# Patient Record
Sex: FEMALE | Race: WHITE | NOT HISPANIC OR LATINO | Employment: UNEMPLOYED | URBAN - METROPOLITAN AREA
[De-identification: names, ages, dates, MRNs, and addresses within clinical notes are randomized per-mention and may not be internally consistent; named-entity substitution may affect disease eponyms.]

---

## 2017-01-31 ENCOUNTER — HISTORICAL (OUTPATIENT)
Dept: LAB | Facility: HOSPITAL | Age: 32
End: 2017-01-31

## 2017-03-09 ENCOUNTER — HISTORICAL (OUTPATIENT)
Dept: LAB | Facility: HOSPITAL | Age: 32
End: 2017-03-09

## 2017-03-23 ENCOUNTER — HOSPITAL ENCOUNTER (OUTPATIENT)
Dept: OBSTETRICS AND GYNECOLOGY | Facility: HOSPITAL | Age: 32
End: 2017-03-23
Attending: OBSTETRICS & GYNECOLOGY | Admitting: OBSTETRICS & GYNECOLOGY

## 2017-05-29 ENCOUNTER — HISTORICAL (OUTPATIENT)
Dept: LAB | Facility: HOSPITAL | Age: 32
End: 2017-05-29

## 2017-05-29 LAB — GLUCOSE 1H P 100 G GLC PO SERPL-MCNC: 152 MG/DL (ref 100–180)

## 2017-05-31 ENCOUNTER — HISTORICAL (OUTPATIENT)
Dept: LAB | Facility: HOSPITAL | Age: 32
End: 2017-05-31

## 2017-05-31 LAB
GLUCOSE 1H P 100 G GLC PO SERPL-MCNC: 179 MG/DL (ref 100–180)
GLUCOSE 2H P 100 G GLC PO SERPL-MCNC: 137 MG/DL (ref 70–140)
GLUCOSE 3H P 100 G GLC PO SERPL-MCNC: 89 MG/DL (ref 70–115)
GLUCOSE BS SERPL-MCNC: 93 MG/DL (ref 70–115)

## 2017-06-28 ENCOUNTER — HISTORICAL (OUTPATIENT)
Dept: LAB | Facility: HOSPITAL | Age: 32
End: 2017-06-28

## 2017-06-28 LAB
ABS NEUT (OLG): 10.5 X10(3)/MCL (ref 2.1–9.2)
ALBUMIN SERPL-MCNC: 2.6 GM/DL (ref 3.4–5)
ALBUMIN/GLOB SERPL: 0.6 RATIO (ref 1.1–2)
ALP SERPL-CCNC: 121 UNIT/L (ref 46–116)
ALT SERPL-CCNC: 26 UNIT/L (ref 12–78)
AST SERPL-CCNC: 17 UNIT/L (ref 15–37)
BASOPHILS # BLD AUTO: 0.1 X10(3)/MCL
BASOPHILS NFR BLD AUTO: 0 % (ref 0–2)
BILIRUB SERPL-MCNC: 0.4 MG/DL (ref 0.2–1)
BILIRUBIN DIRECT+TOT PNL SERPL-MCNC: 0.1 MG/DL (ref 0–0.2)
BILIRUBIN DIRECT+TOT PNL SERPL-MCNC: 0.3 MG/DL (ref 0–0.8)
BUN SERPL-MCNC: 3 MG/DL (ref 7–18)
CALCIUM SERPL-MCNC: 9.2 MG/DL (ref 8.5–10.1)
CHLORIDE SERPL-SCNC: 105 MMOL/L (ref 98–107)
CO2 SERPL-SCNC: 24.3 MMOL/L (ref 21–32)
CREAT SERPL-MCNC: 0.56 MG/DL (ref 0.6–1.3)
EOSINOPHIL # BLD AUTO: 0.2 X10(3)/MCL
EOSINOPHIL NFR BLD AUTO: 1 %
ERYTHROCYTE [DISTWIDTH] IN BLOOD BY AUTOMATED COUNT: 13.5 % (ref 11.5–17)
GLOBULIN SER-MCNC: 4.4 GM/DL (ref 2.4–3.5)
GLUCOSE SERPL-MCNC: 138 MG/DL (ref 74–106)
HCT VFR BLD AUTO: 38.2 % (ref 37–47)
HGB BLD-MCNC: 12.9 GM/DL (ref 12–16)
LDH SERPL-CCNC: 143 UNIT/L (ref 81–234)
LYMPHOCYTES # BLD AUTO: 2.2 X10(3)/MCL
LYMPHOCYTES NFR BLD AUTO: 16 % (ref 13–40)
MCH RBC QN AUTO: 29.7 PG (ref 27–31)
MCHC RBC AUTO-ENTMCNC: 33.7 GM/DL (ref 33–36)
MCV RBC AUTO: 88 FL (ref 80–94)
MONOCYTES # BLD AUTO: 0.6 X10(3)/MCL
MONOCYTES NFR BLD AUTO: 4 % (ref 2–11)
NEUTROPHILS # BLD AUTO: 10.5 X10(3)/MCL (ref 2.1–9.2)
NEUTROPHILS NFR BLD AUTO: 78 % (ref 47–80)
PLATELET # BLD AUTO: 368 X10(3)/MCL (ref 130–400)
PMV BLD AUTO: 9.1 FL (ref 7.4–10.4)
POTASSIUM SERPL-SCNC: 3.7 MMOL/L (ref 3.5–5.1)
PROT SERPL-MCNC: 7 GM/DL (ref 6.4–8.2)
RBC # BLD AUTO: 4.34 X10(6)/MCL (ref 4.2–5.4)
SODIUM SERPL-SCNC: 139 MMOL/L (ref 136–145)
URATE SERPL-MCNC: 4.3 MG/DL (ref 2.6–7.2)
WBC # SPEC AUTO: 13.5 X10(3)/MCL (ref 4.5–11.5)

## 2017-07-13 ENCOUNTER — HISTORICAL (OUTPATIENT)
Dept: LAB | Facility: HOSPITAL | Age: 32
End: 2017-07-13

## 2017-08-21 LAB — POC BETA-HCG (QUAL): NEGATIVE

## 2017-10-17 LAB — POC BETA-HCG (QUAL): NEGATIVE

## 2017-11-20 LAB — POC BETA-HCG (QUAL): NEGATIVE

## 2022-04-11 ENCOUNTER — HISTORICAL (OUTPATIENT)
Dept: ADMINISTRATIVE | Facility: HOSPITAL | Age: 37
End: 2022-04-11

## 2022-04-29 VITALS
DIASTOLIC BLOOD PRESSURE: 75 MMHG | SYSTOLIC BLOOD PRESSURE: 120 MMHG | BODY MASS INDEX: 35.7 KG/M2 | HEIGHT: 69 IN | WEIGHT: 241 LBS

## 2022-09-21 ENCOUNTER — HISTORICAL (OUTPATIENT)
Dept: ADMINISTRATIVE | Facility: HOSPITAL | Age: 37
End: 2022-09-21

## 2023-11-05 ENCOUNTER — HOSPITAL ENCOUNTER (EMERGENCY)
Facility: HOSPITAL | Age: 38
Discharge: HOME | End: 2023-11-05
Attending: SPECIALIST
Payer: MEDICAID

## 2023-11-05 VITALS
TEMPERATURE: 98 F | OXYGEN SATURATION: 99 % | WEIGHT: 260 LBS | DIASTOLIC BLOOD PRESSURE: 85 MMHG | HEART RATE: 104 BPM | BODY MASS INDEX: 37.22 KG/M2 | RESPIRATION RATE: 18 BRPM | HEIGHT: 70 IN | SYSTOLIC BLOOD PRESSURE: 133 MMHG

## 2023-11-05 DIAGNOSIS — F12.90 MARIJUANA USE: Primary | ICD-10-CM

## 2023-11-05 PROCEDURE — 99283 EMERGENCY DEPT VISIT LOW MDM: CPT

## 2023-11-06 NOTE — ED PROVIDER NOTES
"Encounter Date: 11/5/2023       History     Chief Complaint   Patient presents with    Drug Overdose     Pt took 24mg of THC gummy -states she has never used this much before but she was feeling bad today --states "I feel funny" denies pain     Patient presents after taking 24 mg of THC gummies and states she feels "high"; patient is oriented x3 and able to answer all questions without difficulty; no respiratory issues    The history is provided by the patient and the EMS personnel.     Review of patient's allergies indicates:   Allergen Reactions    Aspirin      No past medical history on file.  No past surgical history on file.  No family history on file.     Review of Systems   Constitutional: Negative.    HENT: Negative.     Respiratory: Negative.     Cardiovascular: Negative.    Gastrointestinal: Negative.    Musculoskeletal: Negative.    Skin: Negative.    Neurological: Negative.    All other systems reviewed and are negative.      Physical Exam     Initial Vitals [11/05/23 1830]   BP Pulse Resp Temp SpO2   133/85 104 18 98 °F (36.7 °C) 99 %      MAP       --         Physical Exam    Nursing note and vitals reviewed.  Constitutional: She appears well-developed and well-nourished.   HENT:   Head: Normocephalic and atraumatic.   Eyes: EOM are normal. Pupils are equal, round, and reactive to light.   Neck: Neck supple.   Normal range of motion.  Cardiovascular:  Normal rate, regular rhythm, normal heart sounds and intact distal pulses.           Pulmonary/Chest: Breath sounds normal.   Abdominal: Abdomen is soft.   Musculoskeletal:         General: Normal range of motion.      Cervical back: Normal range of motion and neck supple.     Neurological: She is alert and oriented to person, place, and time. She has normal strength. GCS score is 15. GCS eye subscore is 4. GCS verbal subscore is 5. GCS motor subscore is 6.   Skin: Skin is warm and dry.         ED Course   Procedures  Labs Reviewed - No data to display   " "    Imaging Results    None          Medications - No data to display  Medical Decision Making  Patient presents after taking 24 mg of THC gummies and states she feels "high"; patient is oriented x3 and able to answer all questions without difficulty; no respiratory issues    DIFFERENTIAL DIAGNOSIS- marijuana use, marijuana abuse    Risk  Risk Details: Patient is alert and oriented; no respiratory symptoms, hemodynamically stable and mentally competent; recommend follow up with primary care physician to discuss                               Clinical Impression:   Final diagnoses:  [F12.90] Marijuana use (Primary)        ED Disposition Condition    Discharge Stable          ED Prescriptions    None       Follow-up Information       Follow up With Specialties Details Why Contact Info    Primary care MD  In 1 day Call for an appointment and seek drug rehabilitation              Travon Lam MD  11/06/23 0117    "

## 2024-01-11 ENCOUNTER — HOSPITAL ENCOUNTER (OUTPATIENT)
Dept: RADIOLOGY | Facility: HOSPITAL | Age: 39
Discharge: HOME | End: 2024-01-11
Attending: PODIATRIST
Payer: MEDICAID

## 2024-01-11 DIAGNOSIS — M79.672 LEFT FOOT PAIN: ICD-10-CM

## 2024-01-11 DIAGNOSIS — M72.2 PLANTAR FASCIAL FIBROMATOSIS: Primary | ICD-10-CM

## 2024-01-11 DIAGNOSIS — M72.2 PLANTAR FASCIAL FIBROMATOSIS: ICD-10-CM

## 2024-01-11 PROCEDURE — 73630 X-RAY EXAM OF FOOT: CPT | Mod: TC,LT

## 2024-01-11 PROCEDURE — 73630 X-RAY EXAM OF FOOT: CPT | Mod: TC,RT

## 2025-02-13 DIAGNOSIS — R51.9 NONINTRACTABLE HEADACHE, UNSPECIFIED CHRONICITY PATTERN, UNSPECIFIED HEADACHE TYPE: Primary | ICD-10-CM

## 2025-03-25 ENCOUNTER — TELEPHONE (OUTPATIENT)
Dept: NEUROLOGY | Facility: CLINIC | Age: 40
End: 2025-03-25
Payer: MEDICAID

## 2025-03-28 ENCOUNTER — OFFICE VISIT (OUTPATIENT)
Dept: NEUROLOGY | Facility: CLINIC | Age: 40
End: 2025-03-28
Payer: MEDICAID

## 2025-03-28 VITALS
DIASTOLIC BLOOD PRESSURE: 80 MMHG | HEIGHT: 70 IN | OXYGEN SATURATION: 96 % | BODY MASS INDEX: 40.14 KG/M2 | SYSTOLIC BLOOD PRESSURE: 118 MMHG | HEART RATE: 97 BPM | RESPIRATION RATE: 20 BRPM | WEIGHT: 280.38 LBS | TEMPERATURE: 98 F

## 2025-03-28 DIAGNOSIS — R11.0 NAUSEA: Chronic | ICD-10-CM

## 2025-03-28 DIAGNOSIS — G62.9 NEUROPATHY: Chronic | ICD-10-CM

## 2025-03-28 DIAGNOSIS — E66.812 CLASS 2 OBESITY WITH BODY MASS INDEX (BMI) OF 37.0 TO 37.9 IN ADULT, UNSPECIFIED OBESITY TYPE, UNSPECIFIED WHETHER SERIOUS COMORBIDITY PRESENT: ICD-10-CM

## 2025-03-28 DIAGNOSIS — R01.1 MURMUR, CARDIAC: Chronic | ICD-10-CM

## 2025-03-28 DIAGNOSIS — G43.E11 CHRONIC MIGRAINE WITH AURA, INTRACTABLE, WITH STATUS MIGRAINOSUS: Primary | Chronic | ICD-10-CM

## 2025-03-28 DIAGNOSIS — M54.2 NECK PAIN: Chronic | ICD-10-CM

## 2025-03-28 PROBLEM — E66.9 OBESITY: Status: ACTIVE | Noted: 2025-03-28

## 2025-03-28 PROCEDURE — 99215 OFFICE O/P EST HI 40 MIN: CPT | Mod: PBBFAC | Performed by: NURSE PRACTITIONER

## 2025-03-28 RX ORDER — AZELASTINE 1 MG/ML
SPRAY, METERED NASAL
COMMUNITY
Start: 2024-08-05

## 2025-03-28 RX ORDER — TRAZODONE HYDROCHLORIDE 100 MG/1
TABLET ORAL
COMMUNITY
Start: 2024-11-20

## 2025-03-28 RX ORDER — OLOPATADINE HYDROCHLORIDE 1 MG/ML
SOLUTION/ DROPS OPHTHALMIC
COMMUNITY

## 2025-03-28 RX ORDER — GABAPENTIN 100 MG/1
100 CAPSULE ORAL 3 TIMES DAILY
Qty: 90 CAPSULE | Refills: 4 | Status: SHIPPED | OUTPATIENT
Start: 2025-03-28 | End: 2026-03-28

## 2025-03-28 RX ORDER — ESCITALOPRAM OXALATE 20 MG/1
20 TABLET ORAL
COMMUNITY

## 2025-03-28 RX ORDER — ASPIRIN 325 MG
1 TABLET, DELAYED RELEASE (ENTERIC COATED) ORAL
COMMUNITY
Start: 2024-05-14

## 2025-03-28 RX ORDER — ALPRAZOLAM 1 MG/1
0.5-1 TABLET ORAL 2 TIMES DAILY PRN
COMMUNITY

## 2025-03-28 RX ORDER — SUMATRIPTAN SUCCINATE 100 MG/1
100 TABLET ORAL 2 TIMES DAILY PRN
Qty: 12 TABLET | Refills: 2 | Status: SHIPPED | OUTPATIENT
Start: 2025-03-28

## 2025-03-28 RX ORDER — NYSTATIN 100000 U/G
OINTMENT TOPICAL
COMMUNITY
Start: 2024-05-21

## 2025-03-28 RX ORDER — MONTELUKAST SODIUM 10 MG/1
10 TABLET ORAL
COMMUNITY

## 2025-03-28 RX ORDER — METOCLOPRAMIDE 10 MG/1
10 TABLET ORAL 3 TIMES DAILY PRN
Qty: 20 TABLET | Refills: 2 | Status: SHIPPED | OUTPATIENT
Start: 2025-03-28

## 2025-03-28 RX ORDER — SUMATRIPTAN SUCCINATE 25 MG/1
TABLET ORAL
COMMUNITY
End: 2025-03-28 | Stop reason: SDUPTHER

## 2025-03-28 RX ORDER — KETOROLAC TROMETHAMINE 10 MG/1
10 TABLET, FILM COATED ORAL EVERY 6 HOURS
Qty: 20 TABLET | Refills: 0 | Status: SHIPPED | OUTPATIENT
Start: 2025-03-28 | End: 2025-04-02

## 2025-03-28 RX ORDER — LORATADINE 10 MG/1
10 TABLET ORAL
COMMUNITY

## 2025-03-28 RX ORDER — LISINOPRIL 10 MG/1
10 TABLET ORAL
COMMUNITY

## 2025-03-28 RX ORDER — TIZANIDINE 2 MG/1
2 TABLET ORAL 2 TIMES DAILY PRN
Qty: 30 TABLET | Refills: 2 | Status: SHIPPED | OUTPATIENT
Start: 2025-03-28 | End: 2026-03-28

## 2025-03-28 RX ORDER — FLUTICASONE PROPIONATE 50 MCG
SPRAY, SUSPENSION (ML) NASAL
COMMUNITY

## 2025-03-28 NOTE — PROGRESS NOTES
[facility] Initial Office Visit Note    Initial Visit Date: 3/28/2025  Last Visit Date:   Current Visit Date:  03/28/2025    Chief Complaint:     Chief Complaint   Patient presents with    Headache     Pt states headaches are daily       History of Present Illness:      This is 40 y.o. female with history of EVELIA, plantar facial fibromatosis, insomnia, vitamin D deficiency, depression, rhinitis, who is referred headache disorder. Denies snoring, denies awakening gasping for air, occasional naps, occasional daytime somnolence. Also c/o numbness/tingling/burning to L arm, numbness to toes on L foot. Admits to neck pain, numbness worse to hands when laying down.    Age of Onset : 12-12 YO    Headache Description: located to L frontal area sometimes L occipital area; constant dull, pressure, stabbing, throbbing, accompanied by sparkles, bright lights, photophobia/phonophobia, nausea    Frequency: 30 headache days per month     Provocation Factors: lack of sleep; increased stress    Risk Factors  - Family history of headache disorder: No  - History of focal CNS lesions: Unknown  - History of CNS infections: No  - History head trauma: Yes fell through trampoline; broken nose  - History of underlying mood disorder: Yes depression/anxiety  - History of sleep disorder: Yes insomnia; better with trazodone  - Recreational drug use: No  - Tobacco use: No  - Alcohol use: No  - Weight fluctuation: No  - Isotretinoin or Tetracycline use:  Unknown  - Family planning and contraceptive use: Yes IUD    Medications:     Current Prophylactic  Escitalopram 20mg PO Qday (4/3/2024 - present) ineffective  Lisinopril 10mg PO Qday (10/11/2024 - present) ineffective  Trazodone 100mg nightly (11/20/2024 - present) ineffective    Current Abortive  Sumatriptan 25mg PO BID PRN (2/4/2025 - present)  ineffective    Prior Prophylactic  Paxil 10mg PO (1/2/2017 - 1/2/2018) ineffective    Prior Abortive  Ubrelvy samples via PCP - effective but caused  sedation    Devices:     - VNS:  - TNS  - TMS:     Procedures:     - Botox:  - PSG block:   - Occipital nerve block:     Labs:     Results for orders placed or performed in visit on 09/21/22   POCT urine pregnancy    Collection Time: 11/20/17 10:50 AM   Result Value Ref Range    POC Beta-HCG (Qual) Negative        Studies:     - MRI Brain:   - MRA Head w/o Xavier:   - MRV Head w/o Xavier:   - NCHCT:  - Lumbar Puncture:    Review of Systems:     ROS  As per HPI. All other systems negative  Physical Exams:     Vitals:    03/28/25 1053   BP: 118/80   Pulse: 97   Resp: 20   Temp: 98 °F (36.7 °C)     Physical Exam  Constitutional:       Appearance: Normal appearance. She is obese.   HENT:      Head: Normocephalic and atraumatic.      Right Ear: External ear normal.      Left Ear: External ear normal.      Nose: Nose normal.      Mouth/Throat:      Mouth: Mucous membranes are moist.      Pharynx: Oropharynx is clear.   Eyes:      Conjunctiva/sclera: Conjunctivae normal.   Neck:      Comments: Tenderness to palpation bilat C-spine radiating to bilat trapezius w/increased tone; painful ROM to R c-spine when turning to R, painful ROM when turning to L, painful neck pain with bilat ear to shoulder tile  Cardiovascular:      Rate and Rhythm: Normal rate and regular rhythm.      Pulses: Normal pulses.      Heart sounds: Murmur heard.   Pulmonary:      Effort: Pulmonary effort is normal.      Breath sounds: Normal breath sounds.   Abdominal:      General: There is no distension.      Tenderness: There is no guarding.   Musculoskeletal:         General: Normal range of motion.      Cervical back: Neck supple. Tenderness present.   Skin:     General: Skin is warm and dry.      Coloration: Skin is not jaundiced.      Findings: No lesion or rash.   Neurological:      Mental Status: She is alert.     Comprehensive Neurological Exam:  Mental Status: Alert Oriented to Self, Date, and Place. Naming, repetition, and reading wnl. Comprehension  wnl. No dysarthria.   CN II - XII: KAITY, No APD, VA grossly intact to finger counting at 6 ft, VFFC, No ptosis OU, EOMI without nystagmus, LT/Temp symmetric in CN V1-3 distribution w/exception of decreased sensation to L V1-3, Hearing grossly intact, Face Symmetric, Tongue and Uvula midline, Trapezius symmetric bilateral.   Motor: tone and bulk wnl throughout, no abnormal involuntary or voluntary movements, 5/5 to confrontation, Fine finger movements wnl b/l, No pronator drift.   Sensory: LT, Proprioception, Vibration, PP, Temp symmetric w/exception of decreased sensation to L upper/lower ext. No sensory simultagnosia.   Reflexes: 2+ throughout, plantar reflexes downward bilateral.   Cerebellar: FNF wnl b/l  Romberg: Negative  Gait: normal. Heel Gait, Toe Gait, Tandem Gait wnl. Bilat arm swing itnact    Assessment:     This is 40 y.o. female with history of EVELIA, plantar facial fibromatosis, insomnia, vitamin D deficiency, who is referred headache disorder. Symptoms indicative of chronic daily and migraine headache with aura, intractable with status. Affects ADLs. Sumatriptan ineffective at current dose. Tried sample of Ubrelvy which caused sedation. Also c/o neck pain with bilat hand numbness when laying down. Tingling/burning to L upper ext and numbness to toes on L foot. . Sleeping well on trazodone. Not symptomatic for FRENCH. Murmur auscultated, will refer to cardiology. No formal exercxie program at this time    1. Chronic migraine with aura, intractable, with status migrainosus  -     gabapentin (NEURONTIN) 100 MG capsule; Take 1 capsule (100 mg total) by mouth 3 (three) times daily.  Dispense: 90 capsule; Refill: 4  -     ketorolac (TORADOL) 10 mg tablet; Take 1 tablet (10 mg total) by mouth every 6 (six) hours. for 5 days  Dispense: 20 tablet; Refill: 0  -     sumatriptan (IMITREX) 100 MG tablet; Take 1 tablet (100 mg total) by mouth 2 (two) times daily as needed (migraine).  Dispense: 12 tablet; Refill:  2    2. Neck pain  -     Cancel: X-Ray Cervical Spine 5 View W Flex Extxt; Future; Expected date: 03/28/2025  -     Cancel: X-Ray Cervical Spine 5 View W Flex Extxt; Future; Expected date: 04/04/2025  -     tiZANidine (ZANAFLEX) 2 MG tablet; Take 1 tablet (2 mg total) by mouth 2 (two) times daily as needed (tension).  Dispense: 30 tablet; Refill: 2  -     X-Ray Cervical Spine 5 View W Flex Extxt; Future; Expected date: 04/04/2025    3. Nausea  -     metoclopramide HCl (REGLAN) 10 MG tablet; Take 1 tablet (10 mg total) by mouth 3 (three) times daily as needed (nausea).  Dispense: 20 tablet; Refill: 2    4. Murmur, cardiac  -     Ambulatory referral/consult to Cardiology; Future; Expected date: 04/04/2025    5. Neuropathy    6. Class 2 obesity with body mass index (BMI) of 37.0 to 37.9 in adult, unspecified obesity type, unspecified whether serious comorbidity present      Plan:     [] refer to Dr. Corona in [place] for murmur  [] starting gabapentin 100mg PO TID for migraine prevention and neuropathy  [] start metoclopramide 10mg PO BID PRN nausea  [] increase sumatriptan 100mg PO BID PRN at onset of migraine as abortive therapy  [] start tizanidine 2mg PO BDI PRN for neck tension  [] start ketorolac 10mg PO Q6 x 5 days for current headache  [] call office for migraine >24 hrs and failed abortive therapy; will call in HA cocktail; does not tolerate steroids  [] X-ray of C-spine in [place]    RTC 3 mths - TM okay    Headache education provided: good sleep hygiene and 7 hours of sleep per night, stress management, medication overuse education provided. Using more 3 OTC per week may worsen headaches, high intensity interval training has shown to reduce headache frequency. Low carb, high protein has shown to reduce headache frequency. Patient is instructed in keep headache diary.     I have explained the treatment plan, diagnosis, and prognosis to patient. All questions are answered to the best of my  knowledge.     Visit today is associated with current or anticipated ongoing medical care related to this patient's single serious condition/complex condition as documented above.     Face to face time 60 minutes, including documentation, chart review, counseling, education, review of test results, relevant medical records, and coordination of care.       Parvin Pitts, NP-C  General Neurology  03/28/2025

## 2025-04-01 RX ORDER — BUTYROSPERMUM PARKII(SHEA BUTTER), SIMMONDSIA CHINENSIS (JOJOBA) SEED OIL, ALOE BARBADENSIS LEAF EXTRACT .01; 1; 3.5 G/100G; G/100G; G/100G
250 LIQUID TOPICAL DAILY
COMMUNITY

## 2025-04-01 RX ORDER — MULTIVITAMIN
1 TABLET ORAL DAILY
COMMUNITY

## 2025-04-01 RX ORDER — OMEPRAZOLE 20 MG/1
20 CAPSULE, DELAYED RELEASE ORAL DAILY
COMMUNITY

## 2025-04-03 ENCOUNTER — ANESTHESIA EVENT (OUTPATIENT)
Dept: SURGERY | Facility: HOSPITAL | Age: 40
End: 2025-04-03
Payer: MEDICAID

## 2025-04-03 RX ORDER — MUPIROCIN 20 MG/G
OINTMENT TOPICAL
OUTPATIENT
Start: 2025-04-03

## 2025-04-03 RX ORDER — FAMOTIDINE 20 MG/1
20 TABLET, FILM COATED ORAL
OUTPATIENT
Start: 2025-04-03

## 2025-04-03 NOTE — H&P
Patient ID: Madelyn Vance is a 40 y.o. female.    Chief Complaint: No chief complaint on file.      HPI:  HPI uf  aub    Review of Systems nl 12 point    Current Medications[1]    Review of patient's allergies indicates:   Allergen Reactions    Aspirin Anaphylaxis, Nausea And Vomiting and Rash     Chemical burn, Throat swelling    Coconut Anaphylaxis    Pecan extract Nausea And Vomiting     Other Reaction(s): stomach upset    Shrimp Nausea And Vomiting     Other Reaction(s): stomach upset       Past Medical History:   Diagnosis Date    Anxiety and depression     Digestive disorder     Heart murmur     Hypertension     migraines     Obesity     BMI 39.05    Rhinosinusitis        Past Surgical History:   Procedure Laterality Date    APPENDECTOMY         Social History[2]   denies  There were no vitals filed for this visit.    Physical Exam  S1s2 no murmers  Abd-soft ntnd  Lungs-cta b  Ext-no c/c/e  Pelvic-wnl  Neuro-intact  Assessment & Plan:    [facility]   Hysteroscopy with endometrial ablation         [1]   No current facility-administered medications for this encounter.     Current Outpatient Medications   Medication Sig Dispense Refill    ALPRAZolam (XANAX) 1 MG tablet Take 0.5-1 mg by mouth 2 (two) times daily as needed.      azelastine (ASTELIN) 137 mcg (0.1 %) nasal spray Nasal for 25 Days      cholecalciferol, vitamin D3, 1,250 mcg (50,000 unit) capsule Take 1 capsule by mouth every 7 days.      EScitalopram oxalate (LEXAPRO) 20 MG tablet Take 20 mg by mouth.      fluticasone propionate (FLONASE) 50 mcg/actuation nasal spray Nasal for 60 Days      gabapentin (NEURONTIN) 100 MG capsule Take 1 capsule (100 mg total) by mouth 3 (three) times daily. 90 capsule 4    ketorolac (TORADOL) 10 mg tablet Take 1 tablet (10 mg total) by mouth every 6 (six) hours. for 5 days 20 tablet 0    lisinopriL 10 MG tablet Take 10 mg by mouth.      loratadine (CLARITIN) 10 mg tablet Take 10 mg by mouth.      metoclopramide HCl  (REGLAN) 10 MG tablet Take 1 tablet (10 mg total) by mouth 3 (three) times daily as needed (nausea). 20 tablet 2    montelukast (SINGULAIR) 10 mg tablet Take 10 mg by mouth.      multivitamin (THERAGRAN) per tablet Take 1 tablet by mouth once daily.      nystatin (MYCOSTATIN) ointment       olopatadine (PATANOL) 0.1 % ophthalmic solution       omeprazole (PRILOSEC) 20 MG capsule Take 20 mg by mouth once daily.      Saccharomyces boulardii (FLORASTOR) 250 mg capsule Take 250 mg by mouth Daily.      sumatriptan (IMITREX) 100 MG tablet Take 1 tablet (100 mg total) by mouth 2 (two) times daily as needed (migraine). 12 tablet 2    tiZANidine (ZANAFLEX) 2 MG tablet Take 1 tablet (2 mg total) by mouth 2 (two) times daily as needed (tension). 30 tablet 2    traZODone (DESYREL) 100 MG tablet TAKE 1/2 TO 1 (ONE-HALF TO ONE) TABLET BY MOUTH ONCE DAILY AT BEDTIME FOR 30 DAYS     [2]   Social History  Socioeconomic History    Marital status: Single   Tobacco Use    Smoking status: Never    Smokeless tobacco: Never   Substance and Sexual Activity    Alcohol use: Yes    Drug use: Yes     Frequency: 3.0 times per week     Types: Marijuana     Comment: medical marijuana vape    Sexual activity: Yes     Partners: Male

## 2025-04-04 ENCOUNTER — HOSPITAL ENCOUNTER (OUTPATIENT)
Facility: HOSPITAL | Age: 40
Discharge: HOME | End: 2025-04-04
Attending: OBSTETRICS & GYNECOLOGY | Admitting: OBSTETRICS & GYNECOLOGY
Payer: MEDICAID

## 2025-04-04 ENCOUNTER — ANESTHESIA (OUTPATIENT)
Dept: SURGERY | Facility: HOSPITAL | Age: 40
End: 2025-04-04
Payer: MEDICAID

## 2025-04-04 DIAGNOSIS — Z30.2 ADMISSION FOR STERILIZATION: ICD-10-CM

## 2025-04-04 DIAGNOSIS — N92.0 EXCESSIVE OR FREQUENT MENSTRUATION: Primary | ICD-10-CM

## 2025-04-04 DIAGNOSIS — N93.9 ABNORMAL UTERINE BLEEDING: ICD-10-CM

## 2025-04-04 LAB
B-HCG UR QL: NEGATIVE
CTP QC/QA: YES

## 2025-04-04 PROCEDURE — 37000008 HC ANESTHESIA 1ST 15 MINUTES: Performed by: OBSTETRICS & GYNECOLOGY

## 2025-04-04 PROCEDURE — 63600175 PHARM REV CODE 636 W HCPCS: Performed by: NURSE ANESTHETIST, CERTIFIED REGISTERED

## 2025-04-04 PROCEDURE — 36000708 HC OR TIME LEV III 1ST 15 MIN: Performed by: OBSTETRICS & GYNECOLOGY

## 2025-04-04 PROCEDURE — 63600175 PHARM REV CODE 636 W HCPCS: Performed by: ANESTHESIOLOGY

## 2025-04-04 PROCEDURE — 37000009 HC ANESTHESIA EA ADD 15 MINS: Performed by: OBSTETRICS & GYNECOLOGY

## 2025-04-04 PROCEDURE — 71000033 HC RECOVERY, INTIAL HOUR: Performed by: OBSTETRICS & GYNECOLOGY

## 2025-04-04 PROCEDURE — 81025 URINE PREGNANCY TEST: CPT | Performed by: OBSTETRICS & GYNECOLOGY

## 2025-04-04 PROCEDURE — 71000016 HC POSTOP RECOV ADDL HR: Performed by: OBSTETRICS & GYNECOLOGY

## 2025-04-04 PROCEDURE — 88305 TISSUE EXAM BY PATHOLOGIST: CPT | Performed by: OBSTETRICS & GYNECOLOGY

## 2025-04-04 PROCEDURE — 36000709 HC OR TIME LEV III EA ADD 15 MIN: Performed by: OBSTETRICS & GYNECOLOGY

## 2025-04-04 PROCEDURE — 25000003 PHARM REV CODE 250: Performed by: NURSE ANESTHETIST, CERTIFIED REGISTERED

## 2025-04-04 PROCEDURE — 71000015 HC POSTOP RECOV 1ST HR: Performed by: OBSTETRICS & GYNECOLOGY

## 2025-04-04 PROCEDURE — 27201423 OPTIME MED/SURG SUP & DEVICES STERILE SUPPLY: Performed by: OBSTETRICS & GYNECOLOGY

## 2025-04-04 RX ORDER — ONDANSETRON HYDROCHLORIDE 2 MG/ML
INJECTION, SOLUTION INTRAVENOUS
Status: DISCONTINUED | OUTPATIENT
Start: 2025-04-04 | End: 2025-04-04

## 2025-04-04 RX ORDER — ACETAMINOPHEN 10 MG/ML
INJECTION, SOLUTION INTRAVENOUS
Status: DISCONTINUED | OUTPATIENT
Start: 2025-04-04 | End: 2025-04-04

## 2025-04-04 RX ORDER — DEXAMETHASONE SODIUM PHOSPHATE 4 MG/ML
INJECTION, SOLUTION INTRA-ARTICULAR; INTRALESIONAL; INTRAMUSCULAR; INTRAVENOUS; SOFT TISSUE
Status: DISCONTINUED | OUTPATIENT
Start: 2025-04-04 | End: 2025-04-04

## 2025-04-04 RX ORDER — SODIUM CHLORIDE 0.9 % (FLUSH) 0.9 %
10 SYRINGE (ML) INJECTION
OUTPATIENT
Start: 2025-04-04

## 2025-04-04 RX ORDER — SODIUM CHLORIDE, SODIUM GLUCONATE, SODIUM ACETATE, POTASSIUM CHLORIDE AND MAGNESIUM CHLORIDE 30; 37; 368; 526; 502 MG/100ML; MG/100ML; MG/100ML; MG/100ML; MG/100ML
INJECTION, SOLUTION INTRAVENOUS CONTINUOUS
Status: CANCELLED | OUTPATIENT
Start: 2025-04-04 | End: 2025-05-04

## 2025-04-04 RX ORDER — PROCHLORPERAZINE EDISYLATE 5 MG/ML
5 INJECTION INTRAMUSCULAR; INTRAVENOUS EVERY 30 MIN PRN
OUTPATIENT
Start: 2025-04-04

## 2025-04-04 RX ORDER — HYDROMORPHONE HYDROCHLORIDE 2 MG/ML
0.2 INJECTION, SOLUTION INTRAMUSCULAR; INTRAVENOUS; SUBCUTANEOUS EVERY 5 MIN PRN
Refills: 0 | OUTPATIENT
Start: 2025-04-04

## 2025-04-04 RX ORDER — SODIUM CHLORIDE, SODIUM LACTATE, POTASSIUM CHLORIDE, CALCIUM CHLORIDE 600; 310; 30; 20 MG/100ML; MG/100ML; MG/100ML; MG/100ML
INJECTION, SOLUTION INTRAVENOUS CONTINUOUS
Status: CANCELLED | OUTPATIENT
Start: 2025-04-04

## 2025-04-04 RX ORDER — OXYCODONE AND ACETAMINOPHEN 5; 325 MG/1; MG/1
1 TABLET ORAL EVERY 6 HOURS PRN
Qty: 24 TABLET | Refills: 0 | Status: SHIPPED | OUTPATIENT
Start: 2025-04-04

## 2025-04-04 RX ORDER — DIPHENHYDRAMINE HYDROCHLORIDE 50 MG/ML
25 INJECTION, SOLUTION INTRAMUSCULAR; INTRAVENOUS EVERY 4 HOURS PRN
Status: DISCONTINUED | OUTPATIENT
Start: 2025-04-04 | End: 2025-04-04 | Stop reason: HOSPADM

## 2025-04-04 RX ORDER — MIDAZOLAM HYDROCHLORIDE 2 MG/2ML
INJECTION, SOLUTION INTRAMUSCULAR; INTRAVENOUS
Status: DISCONTINUED
Start: 2025-04-04 | End: 2025-04-04 | Stop reason: HOSPADM

## 2025-04-04 RX ORDER — MORPHINE SULFATE 4 MG/ML
3 INJECTION, SOLUTION INTRAMUSCULAR; INTRAVENOUS
Status: DISCONTINUED | OUTPATIENT
Start: 2025-04-04 | End: 2025-04-04 | Stop reason: HOSPADM

## 2025-04-04 RX ORDER — DIPHENHYDRAMINE HCL 25 MG
25 CAPSULE ORAL EVERY 4 HOURS PRN
Status: DISCONTINUED | OUTPATIENT
Start: 2025-04-04 | End: 2025-04-04 | Stop reason: HOSPADM

## 2025-04-04 RX ORDER — HYDROCODONE BITARTRATE AND ACETAMINOPHEN 5; 325 MG/1; MG/1
1 TABLET ORAL EVERY 4 HOURS PRN
Status: DISCONTINUED | OUTPATIENT
Start: 2025-04-04 | End: 2025-04-04 | Stop reason: HOSPADM

## 2025-04-04 RX ORDER — PROPOFOL 10 MG/ML
VIAL (ML) INTRAVENOUS
Status: DISCONTINUED | OUTPATIENT
Start: 2025-04-04 | End: 2025-04-04

## 2025-04-04 RX ORDER — SODIUM CHLORIDE 9 MG/ML
INJECTION, SOLUTION INTRAVENOUS CONTINUOUS PRN
Status: DISCONTINUED | OUTPATIENT
Start: 2025-04-04 | End: 2025-04-04

## 2025-04-04 RX ORDER — FENTANYL CITRATE 50 UG/ML
INJECTION, SOLUTION INTRAMUSCULAR; INTRAVENOUS
Status: DISCONTINUED | OUTPATIENT
Start: 2025-04-04 | End: 2025-04-04

## 2025-04-04 RX ORDER — BUPIVACAINE HYDROCHLORIDE 2.5 MG/ML
INJECTION, SOLUTION EPIDURAL; INFILTRATION; INTRACAUDAL; PERINEURAL
Status: DISCONTINUED
Start: 2025-04-04 | End: 2025-04-04 | Stop reason: HOSPADM

## 2025-04-04 RX ORDER — ROCURONIUM BROMIDE 10 MG/ML
INJECTION, SOLUTION INTRAVENOUS
Status: DISCONTINUED | OUTPATIENT
Start: 2025-04-04 | End: 2025-04-04

## 2025-04-04 RX ORDER — GLUCAGON 1 MG
1 KIT INJECTION
Status: CANCELLED | OUTPATIENT
Start: 2025-04-04

## 2025-04-04 RX ORDER — MIDAZOLAM HYDROCHLORIDE 2 MG/2ML
2 INJECTION, SOLUTION INTRAMUSCULAR; INTRAVENOUS ONCE AS NEEDED
Status: CANCELLED | OUTPATIENT
Start: 2025-04-04 | End: 2036-08-30

## 2025-04-04 RX ORDER — METHOCARBAMOL 100 MG/ML
1000 INJECTION, SOLUTION INTRAMUSCULAR; INTRAVENOUS ONCE AS NEEDED
Status: COMPLETED | OUTPATIENT
Start: 2025-04-04 | End: 2025-04-04

## 2025-04-04 RX ORDER — ONDANSETRON HYDROCHLORIDE 2 MG/ML
4 INJECTION, SOLUTION INTRAVENOUS DAILY PRN
OUTPATIENT
Start: 2025-04-04

## 2025-04-04 RX ORDER — ACETAMINOPHEN 10 MG/ML
INJECTION, SOLUTION INTRAVENOUS
Status: DISCONTINUED
Start: 2025-04-04 | End: 2025-04-04 | Stop reason: HOSPADM

## 2025-04-04 RX ORDER — MIDAZOLAM HYDROCHLORIDE 1 MG/ML
2 INJECTION INTRAMUSCULAR; INTRAVENOUS ONCE
Status: CANCELLED | OUTPATIENT
Start: 2025-04-04 | End: 2025-04-04

## 2025-04-04 RX ORDER — HYDROCODONE BITARTRATE AND ACETAMINOPHEN 5; 325 MG/1; MG/1
1 TABLET ORAL EVERY 4 HOURS PRN
Status: CANCELLED | OUTPATIENT
Start: 2025-04-04

## 2025-04-04 RX ORDER — ACETAMINOPHEN 325 MG/1
650 TABLET ORAL EVERY 4 HOURS PRN
Status: CANCELLED | OUTPATIENT
Start: 2025-04-04

## 2025-04-04 RX ORDER — ONDANSETRON HYDROCHLORIDE 2 MG/ML
4 INJECTION, SOLUTION INTRAVENOUS DAILY PRN
Status: CANCELLED | OUTPATIENT
Start: 2025-04-04

## 2025-04-04 RX ORDER — ONDANSETRON 4 MG/1
8 TABLET, ORALLY DISINTEGRATING ORAL EVERY 8 HOURS PRN
Status: DISCONTINUED | OUTPATIENT
Start: 2025-04-04 | End: 2025-04-04 | Stop reason: HOSPADM

## 2025-04-04 RX ORDER — SODIUM CHLORIDE 9 MG/ML
INJECTION, SOLUTION INTRAVENOUS CONTINUOUS
Status: DISCONTINUED | OUTPATIENT
Start: 2025-04-04 | End: 2025-04-04 | Stop reason: HOSPADM

## 2025-04-04 RX ORDER — LIDOCAINE HYDROCHLORIDE 10 MG/ML
1 INJECTION, SOLUTION EPIDURAL; INFILTRATION; INTRACAUDAL; PERINEURAL ONCE
Status: CANCELLED | OUTPATIENT
Start: 2025-04-04 | End: 2025-04-04

## 2025-04-04 RX ORDER — MIDAZOLAM HYDROCHLORIDE 2 MG/2ML
2 INJECTION, SOLUTION INTRAMUSCULAR; INTRAVENOUS ONCE
Status: COMPLETED | OUTPATIENT
Start: 2025-04-04 | End: 2025-04-04

## 2025-04-04 RX ORDER — GLYCOPYRROLATE 0.2 MG/ML
INJECTION INTRAMUSCULAR; INTRAVENOUS
Status: DISCONTINUED | OUTPATIENT
Start: 2025-04-04 | End: 2025-04-04

## 2025-04-04 RX ORDER — ONDANSETRON 4 MG/1
4 TABLET, ORALLY DISINTEGRATING ORAL ONCE
Status: CANCELLED | OUTPATIENT
Start: 2025-04-04 | End: 2025-04-04

## 2025-04-04 RX ORDER — DIPHENHYDRAMINE HYDROCHLORIDE 50 MG/ML
25 INJECTION, SOLUTION INTRAMUSCULAR; INTRAVENOUS ONCE
Status: CANCELLED | OUTPATIENT
Start: 2025-04-04 | End: 2025-04-04

## 2025-04-04 RX ORDER — KETOROLAC TROMETHAMINE 30 MG/ML
INJECTION, SOLUTION INTRAMUSCULAR; INTRAVENOUS
Status: DISCONTINUED | OUTPATIENT
Start: 2025-04-04 | End: 2025-04-04

## 2025-04-04 RX ADMIN — ONDANSETRON 4 MG: 2 INJECTION INTRAMUSCULAR; INTRAVENOUS at 07:04

## 2025-04-04 RX ADMIN — DEXAMETHASONE SODIUM PHOSPHATE 8 MG: 4 INJECTION, SOLUTION INTRA-ARTICULAR; INTRALESIONAL; INTRAMUSCULAR; INTRAVENOUS; SOFT TISSUE at 07:04

## 2025-04-04 RX ADMIN — FENTANYL CITRATE 100 MCG: 50 INJECTION, SOLUTION INTRAMUSCULAR; INTRAVENOUS at 07:04

## 2025-04-04 RX ADMIN — ROCURONIUM BROMIDE 50 MG: 10 INJECTION, SOLUTION INTRAVENOUS at 07:04

## 2025-04-04 RX ADMIN — SUGAMMADEX 200 MG: 100 INJECTION, SOLUTION INTRAVENOUS at 08:04

## 2025-04-04 RX ADMIN — METHOCARBAMOL 1000 MG: 100 INJECTION INTRAMUSCULAR; INTRAVENOUS at 08:04

## 2025-04-04 RX ADMIN — KETOROLAC TROMETHAMINE 30 MG: 30 INJECTION, SOLUTION INTRAMUSCULAR at 08:04

## 2025-04-04 RX ADMIN — SODIUM CHLORIDE: 9 INJECTION, SOLUTION INTRAVENOUS at 07:04

## 2025-04-04 RX ADMIN — GLYCOPYRROLATE 0.2 MG: 0.2 INJECTION INTRAMUSCULAR; INTRAVENOUS at 07:04

## 2025-04-04 RX ADMIN — PROPOFOL 180 MG: 10 INJECTION, EMULSION INTRAVENOUS at 07:04

## 2025-04-04 RX ADMIN — MIDAZOLAM HYDROCHLORIDE 2 MG: 1 INJECTION, SOLUTION INTRAMUSCULAR; INTRAVENOUS at 07:04

## 2025-04-04 RX ADMIN — SODIUM CHLORIDE, POTASSIUM CHLORIDE, SODIUM LACTATE AND CALCIUM CHLORIDE: 600; 310; 30; 20 INJECTION, SOLUTION INTRAVENOUS at 07:04

## 2025-04-04 RX ADMIN — ACETAMINOPHEN 1000 MG: 10 INJECTION, SOLUTION INTRAVENOUS at 07:04

## 2025-04-04 NOTE — OP NOTE
[facility]                       [address] [place] [address]    PATIENT NAME:      CRYSTAL MORENO   YOB: 1985  CSN:               [id number]  MRN:               [id number]  ADMIT DATE:        04/04/2025 05:55:00  PHYSICIAN:         Haresh Gaming Jr, MD                          OPERATIVE REPORT      DATE OF SURGERY:    04/04/2025 00:00:00    SURGEON:  Haresh Gaming Jr, MD    PROCEDURE:  Laparoscopic tubal cauterization, hysteroscopy, D and C with an   endometrial ablation.    PROCEDURE IN DETAIL:  The patient was taken to the operating room, where general   anesthesia was administered and found to be adequate.  Abdomen, perineum, and   vagina were prepped and draped in the usual sterile fashion.  Legs were placed   in Reji stirrups in the dorsal lithotomy position.  Red rubber catheter was   inserted to the bladder, 80 cc of clear urine obtained.  A bivalve speculum was   inserted into the vagina.  Anterior lip of the cervix was grasped with a   single-tooth tenaculum.  An Hardy manipulator was placed into the cervical os.    Infraumbilical skin incision made with a knife.  5 mm trocar was inserted into   the abdomen under direct visualization.  Pneumoperitoneum was created with 2.8 L   of CO2 gas.  The patient was placed in slight Trendelenburg.  Hardy manipulator   was used to manipulate the uterus.  Uterus noted to be normal.   Ovaries and   fallopian tubes also normal.  Left lateral port was placed.  A 5 mm trocar was   inserted to the abdomen.  The Kleppinger was then inserted through the lateral   port.  The fallopian tube was grasped 3 cm from the cornual region and burned in   successive segments until no viable tissue was noted.  The same was carried on   the opposite side.  Rest of abdominal pelvic survey was noted to be normal.  No   other adhesions or abnormalities were noted.  The  pneumoperitoneum was allowed   to escape.  Puncture sites were closed with a 4-0 Monocryl.  Attention was then   paid to the vaginal part of the procedure.  The Manuel Garcia manipulator was removed.    The cervix was dilated with Thrasher dilators.  A sample of the endometrium was   obtained with a sharp curette.  Hysteroscopy was performed, that showed a normal   endometrial echo.  Appropriate measurements for the NovaSure device were   placed, 6.5 length and width of 3.  The device was activated for 1 minute and 8   seconds.  Hysteroscopy performed after the procedure showed a good endometrial   burn.  Puncture sites on the cervix were cauterized with a silver nitrate stick.    The patient tolerated the procedure well.  .  Needle, sponge, lap counts were   correct x2.  Blood loss was less than 20.        ______________________________  MD MELCHOR Soriano Jr/AQS  DD:  04/04/2025  Time:  09:03AM  DT:  04/04/2025  Time:  09:38AM  Job #:  [id number]      OPERATIVE REPORT

## 2025-04-04 NOTE — DISCHARGE INSTRUCTIONS
Patient Education       Dilation and Curettage Discharge Instructions     What care is needed at home?   It is normal to have vaginal bleeding for a few weeks. You may use sanitary pads, but not tampons.  Do not douche, use tampons, or have sexual contact for 2 weeks or until release by your doctor.  You may shower in 24 hours.  No tub baths, swimming, or use a hot tub until release by your doctor.  Ask your doctor about when it is safe for you to go back to your normal activities like work, driving, and sex.  Take your medications as ordered.  What follow-up care is needed?   Be sure to keep your follow-up visit.  Will physical activity be limited?   Rest for the first few days after the procedure. Avoid strenuous activities like heavy lifting and hard workouts.  What problems could happen?   Harm to the cervix  Scarring of the lining of the uterus  Infection  Bleeding  A hole in the uterus from the tools used during the procedure  When do I need to call the doctor?   Signs of infection like a fever of 100.4°F (38°C) or higher, chills, pain with passing urine, or bad smelling drainage from the vagina.  Very bad belly pain  Heavy bleeding (more than 2 pads an hour or heavier than your normal cycle)  Upset stomach and throwing up  You are not feeling better in 2 to 3 days or you are feeling worse     Patient Education       Hysteroscopy Discharge Instructions   About this topic   The uterus is the female organ where the baby grows during pregnancy. The uterus is also known as the womb. The womb is found in the lower belly between the bladder and the rectum.  A hysteroscopy lets the doctor look inside the womb. You may need to have a hysteroscopy if your doctor is worried there is something wrong on the inside of your womb. The doctor may want to confirm other tests or get a tissue sample. The procedure may also be done if you do not want to be able to have more children.     What care is needed at home?   Rest for  the first few days after the procedure. Avoid activities like heavy lifting and hard exercise.  You may shower 24 hours after the surgery. Proper washing will help prevent infection.  Avoid soaking in a bath or a hot tub until your doctor tells you it is safe to do so.  You can expect some bleeding from your vagina for a few weeks. You may use sanitary pads but not tampons.  Your doctor may give you a drug to help heal the lining of the womb. You may have to take the drug for a few weeks. Take the drug as ordered by your doctor.  You may need lubricants for sex after the procedure. Ask your doctor when it is safe for you to have sex.  What follow-up care is needed?   Be sure to keep your follow up visit.  Will physical activity be limited?   You may have to limit your activity. Ask your doctor when you may go back to your normal activities like working, driving, or sex.  What problems could happen?   Not able to get pregnant if the lining of the uterus was destroyed  Infection  Small hole in the uterus  Cuts on the cervix  Heavy blood loss  Blood clots  When do I need to call the doctor?   Signs of infection such as a fever of 100.4°F (38°C) or higher, chills, pain with passing urine, wound that will not heal, or anal itching.  Lots of blood in your sanitary pads or more than 6 soaked pads per day  Upset stomach, throwing up, or very bad belly pain  Trouble passing urine  Smelly green or dark yellow vaginal discharge  Cough, shortness of breath, trouble swallowing, or chest pain     Tubal Ligation, Laparoscopic Surgery Discharge Instructions   About this topic   A tubal ligation is a long-lasting type of birth control for women. After this surgery, it would be rare for a woman to get pregnant. A woman's eggs are made in her ovaries. Once a month, an egg leaves the ovary and travels down a long thin tube to her uterus or womb. This tube is called the fallopian tube. If a sperm meets the egg, a woman can get  pregnant.  During a tubal ligation, the fallopian tubes are cut or tied. Cutting or tying the tubes will stop the sperm from meeting the egg. After this surgery, most women cannot get pregnant.  A tubal ligation is a permanent form of birth control. You should be 100% sure before you have this surgery that you do not want to have any more children. You and your partner should have talked about your decision to not have any more children. A tubal ligation's reversal can occur but is not always successful.  This surgery does not protect you from sexually-transmitted diseases. You will still need to use a condom to protect yourself and your partner against these diseases.       What care is needed at home?   You have steri-strips. You may wash your incision with soap and water. Wash your hands before and after touching your incsions. The steri-strips may fall off on there on within 1-2 weeks. You do not have to peel them off.   You may take a shower tomorrow. No tub baths or soaking. No swimming.    Pelvic rest until your follow-up appointment with your doctor. No sex, tampons, and do not douche.  Ask your doctor when you can go back to your normal activities like work, driving, and sex  You can expect some bleeding from your vagina for a few weeks. You may use sanitary pads but not tampons.  Your bowel movements may take some time to get back to normal. Eat small meals high in fiber to avoid hard stools. Drink 6 to 8 glasses of water each day.  Try to walk each day. Start by walking a little more than you did the day before. Walking boosts blood flow and helps prevent lung, belly, and blood problems.  You will continue to have periods as you did before.  Talk with your doctor about safe sex as you can still be exposed to sexually transmitted diseases.  Do not lift anything over 10 pounds (4.5 kg)  What follow-up care is needed?   Be sure to keep your visits.   Will physical activity be limited?   Talk with your doctor  about the right amount of activity for you.  What problems could happen?   Infection  Wound opening  Heavy blood loss  Blood clots in your legs or lungs  Damage to your bowel, bladder, and other organs inside the belly  Tubes don't close all the way and you could become pregnant  Trouble passing bowel movements  When do I need to call the doctor?   Signs of infection such as a fever of 100.4°F (38°C) or higher, chills, pain with passing urine.  Signs of wound infection such as swelling, redness, warmth around the wound; too much pain when touched; yellowish, greenish, or bloody discharge; foul smell coming from the cut site; cut site opens up.  Excessive blood in your sanitary pads or more than six soaked pads in a day  Smelly green or dark yellow vaginal discharge  Upset stomach, throwing up, or very bad belly pain  Pain not helped by drugs you are taking  No bowel movement after 3 days  If you feel the need to pass urine but urine will not come out even after 6 hours  Swelling in your leg or arm that is much greater on one side than the other  Teach Back: Helping You Understand   The Teach Back Method helps you understand the information we are giving you. After you talk with the staff, tell them in your own words what you learned. This helps to make sure the staff has described each thing clearly. It also helps to explain things that may have been confusing. Before going home, make sure you can do these:  I can tell you about my procedure.  I can tell you how to care for my cut site.  I can tell you what I will do if I have a fever, chills, bad smelling drainage from the vagina, or bad belly pain.  Where can I learn more?   [employer]  https://www.acog.org/Patients/FAQs/Sterilization-by-Laparoscopy   Better [employer]  https://www.betterhealth.[place].gov.[place]/health/HealthyLiving/contraception-female-sterilisation   [employer] on Womens  Health  https://www.womenshealth.gov/a-z-topics/birth-control-methods   Last Reviewed Date   2019-10-18  Consumer Information Use and Disclaimer   This information is not specific medical advice and does not replace information you receive from your health care provider. This is only a brief summary of general information. It does NOT include all information about conditions, illnesses, injuries, tests, procedures, treatments, therapies, discharge instructions or life-style choices that may apply to you. You must talk with your health care provider for complete information about your health and treatment options. This information should not be used to decide whether or not to accept your health care providers advice, instructions or recommendations. Only your health care provider has the knowledge and training to provide advice that is right for you.  Copyright   Copyright © 2021 [employer] and its affiliates and/or licensors. All rights reserved.

## 2025-04-04 NOTE — ANESTHESIA PROCEDURE NOTES
Intubation    Date/Time: 4/4/2025 7:33 AM    Performed by: Lopez Melendez CRNA  Authorized by: Lopez Melendez CRNA    Intubation:     Induction:  Intravenous    Mask Ventilation:  Easy mask    Attempts:  1    Attempted By:  CRNA    Method of Intubation:  Direct    Blade:  Pitts 2    Laryngeal View Grade: Grade I - full view of cords      Difficult Airway Encountered?: No      Complications:  None    Airway Device:  Oral endotracheal tube    Airway Device Size:  7.5    Style/Cuff Inflation:  Cuffed    Tube secured:  21    Secured at:  The lips    Placement Verified By:  Capnometry    Complicating Factors:  None    Findings Post-Intubation:  BS equal bilateral

## 2025-04-04 NOTE — ANESTHESIA POSTPROCEDURE EVALUATION
Anesthesia Post Evaluation    Patient: Madelyn Vance    Procedure(s) Performed: Procedure(s) (LRB):  LIGATION, FALLOPIAN TUBE, LAPAROSCOPIC (N/A)  HYSTEROSCOPY, WITH DILATION AND CURETTAGE OF UTERUS AND HYDROTHERMAL ENDOMETRIAL ABLATION (N/A)  REMOVAL, INTRAUTERINE DEVICE    Final Anesthesia Type: general      Patient location during evaluation: PACU  Patient participation: Yes- Able to Participate  Level of consciousness: awake and alert and oriented  Post-procedure vital signs: reviewed and stable  Pain management: adequate  Airway patency: patent  FRENCH mitigation strategies: Verification of full reversal of neuromuscular block  PONV status at discharge: No PONV  Anesthetic complications: no      Cardiovascular status: blood pressure returned to baseline and stable  Respiratory status: spontaneous ventilation and unassisted  Hydration status: euvolemic  Follow-up not needed.  Comments: [facility]              Vitals Value Taken Time   /80 04/04/25 10:14   Temp 36.7 °C (98.1 °F) 04/04/25 09:14   Pulse 62 04/04/25 10:14   Resp 17 04/04/25 09:08   SpO2 94 % 04/04/25 10:14   Vitals shown include unfiled device data.      Event Time   Out of Recovery 09:07:00         Pain/Lory Score: Lory Score: 10 (4/4/2025  9:10 AM)

## 2025-04-04 NOTE — DISCHARGE SUMMARY
[facility]                       [address] [id number]    PATIENT NAME:       CRYSTAL MORENO   YOB: 1985  CSN:                [id number]   MRN:                [id number]  ADMIT DATE:         04/04/2025 05:55:00  PHYSICIAN:          Haresh Gaming Jr, MD                          DISCHARGE SUMMARY    DATE OF DISCHARGE:  04/04/2025 00:00:00    DIAGNOSES:  Status post laparoscopic tubal cauterization, hysteroscopy, D and C   with an endometrial ablation.  Patient underwent the procedure without an   incident.  She was sent to the postoperative recovery, where she had an   uneventful and speedy recovery she ambulated, tolerated the diet without   difficulty.  Pain was well controlled.  She will be discharged home today.    CONDITION:  Stable.    DIET:  Regular.    ACTIVITY:  Pelvic rest.    MEDICATIONS:    1. Percocet p.r.n.  2. Motrin p.r.n.    FOLLOWUP:  Follow up with Dr. Gaming in 3 weeks.        ______________________________  Haresh Gaming Jr, MD    DJE/AQS  DD:  04/04/2025  Time:  09:30AM  DT:  04/04/2025  Time:  10:16AM  Job #:  [id number]      DISCHARGE SUMMARY

## 2025-04-04 NOTE — TRANSFER OF CARE
"Anesthesia Transfer of Care Note    Patient: Madelyn Vance    Procedure(s) Performed: Procedure(s) (LRB):  LIGATION, FALLOPIAN TUBE, LAPAROSCOPIC (N/A)  HYSTEROSCOPY, WITH DILATION AND CURETTAGE OF UTERUS AND HYDROTHERMAL ENDOMETRIAL ABLATION (N/A)  REMOVAL, INTRAUTERINE DEVICE    Patient location: PACU    Anesthesia Type: general    Transport from OR: Transported from OR on room air with adequate spontaneous ventilation    Post pain: adequate analgesia    Post assessment: no apparent anesthetic complications    Post vital signs: stable    Level of consciousness: awake and sedated    Nausea/Vomiting: no nausea/vomiting    Complications: none    Transfer of care protocol was followed      Last vitals: Visit Vitals  /79   Pulse 63   Temp 36.7 °C (98.1 °F) (Oral)   Resp 18   Ht 5' 11" (1.803 m)   Wt 130 kg (286 lb 9.6 oz)   LMP 03/19/2025 (Exact Date)   SpO2 97%   Breastfeeding No   BMI 39.97 kg/m²     "

## 2025-04-04 NOTE — ANESTHESIA PREPROCEDURE EVALUATION
04/03/2025  Madelyn Vance is a 40 y.o., female.  Procedure Information    Case: [id number] Date/Time: 04/04/25 0730   Procedures:      LIGATION, FALLOPIAN TUBE, LAPAROSCOPIC (Abdomen)      HYSTEROSCOPY, WITH DILATION AND CURETTAGE OF UTERUS AND HYDROTHERMAL ENDOMETRIAL ABLATION (Abdomen)   Anesthesia type: Monitor Anesthesia Care   Diagnosis:      Admission for sterilization [Z30.2]      Excessive or frequent menstruation [N92.0]   Pre-op diagnosis:      Admission for sterilization [Z30.2]      Excessive or frequent menstruation [N92.0]   Location: [facility] OR [address] [place] [address] OR   Surgeons: Haresh Gaming Jr., MD       Pre-op Assessment    I have reviewed the Patient Summary Reports.     I have reviewed the Nursing Notes. I have reviewed the NPO Status.   I have reviewed the Medications.     Review of Systems  Anesthesia Hx:  No problems with previous Anesthesia               Denies Personal Hx of Anesthesia complications.                    Hematology/Oncology:  Hematology Normal   Oncology Normal                                   EENT/Dental:  EENT/Dental Normal           Cardiovascular:  Exercise tolerance: good   Hypertension                  Functional Capacity good / => 4 METS                         Pulmonary:  Pulmonary Normal                       Renal/:   Denies Chronic Renal Disease.                Hepatic/GI:  Hepatic/GI Normal                    Musculoskeletal:  Musculoskeletal Normal                Neurological:      Headaches                                 Endocrine:  Endocrine Normal          Denies Morbid Obesity / BMI > 40  Dermatological:  Skin Normal    Psych:   anxiety                 Physical Exam  General: Alert, Oriented, Well nourished and Cooperative    Airway:  Mallampati: II   Mouth Opening: Normal  TM Distance: Normal  Tongue: Normal  Neck ROM: Normal  ROM    Dental:  Intact    Chest/Lungs:  Clear to auscultation, Normal Respiratory Rate    Heart:  Rate: Normal  Rhythm: Regular Rhythm        Anesthesia Plan  Type of Anesthesia, risks & benefits discussed:    Anesthesia Type: Gen ETT  Intra-op Monitoring Plan: Standard ASA Monitors  Post Op Pain Control Plan: multimodal analgesia  Induction:  IV and Inhalation  Airway Plan: Direct  Informed Consent: Informed consent signed with the Patient and all parties understand the risks and agree with anesthesia plan.  All questions answered. Patient consented to blood products? Yes  ASA Score: 2  Day of Surgery Review of History & Physical: H&P Update referred to the surgeon/provider.    Ready For Surgery From Anesthesia Perspective.     .

## 2025-04-05 VITALS
HEART RATE: 72 BPM | SYSTOLIC BLOOD PRESSURE: 115 MMHG | RESPIRATION RATE: 16 BRPM | HEIGHT: 71 IN | BODY MASS INDEX: 40.13 KG/M2 | WEIGHT: 286.63 LBS | DIASTOLIC BLOOD PRESSURE: 78 MMHG | OXYGEN SATURATION: 92 % | TEMPERATURE: 98 F

## 2025-04-07 LAB — PSYCHE PATHOLOGY RESULT: NORMAL

## 2025-04-11 ENCOUNTER — HOSPITAL ENCOUNTER (EMERGENCY)
Facility: HOSPITAL | Age: 40
Discharge: HOME | End: 2025-04-11
Attending: EMERGENCY MEDICINE
Payer: MEDICAID

## 2025-04-11 VITALS
TEMPERATURE: 99 F | OXYGEN SATURATION: 99 % | HEIGHT: 71 IN | RESPIRATION RATE: 16 BRPM | DIASTOLIC BLOOD PRESSURE: 68 MMHG | BODY MASS INDEX: 39.2 KG/M2 | WEIGHT: 280 LBS | SYSTOLIC BLOOD PRESSURE: 131 MMHG | HEART RATE: 70 BPM

## 2025-04-11 DIAGNOSIS — R10.30 LOWER ABDOMINAL PAIN: Primary | ICD-10-CM

## 2025-04-11 LAB
ALBUMIN SERPL-MCNC: 3.7 G/DL (ref 3.5–5)
ALBUMIN/GLOB SERPL: 0.8 RATIO (ref 1.1–2)
ALP SERPL-CCNC: 72 UNIT/L (ref 40–150)
ALT SERPL-CCNC: 16 UNIT/L (ref 0–55)
AMORPH URATE CRY URNS QL MICRO: ABNORMAL /UL
ANION GAP SERPL CALC-SCNC: 7 MEQ/L
AST SERPL-CCNC: 11 UNIT/L (ref 11–45)
B-HCG UR QL: NEGATIVE
BACTERIA #/AREA URNS AUTO: ABNORMAL /HPF
BASOPHILS # BLD AUTO: 0.09 X10(3)/MCL
BASOPHILS NFR BLD AUTO: 0.7 %
BILIRUB SERPL-MCNC: 0.5 MG/DL
BILIRUB UR QL STRIP.AUTO: NEGATIVE
BUN SERPL-MCNC: 12.1 MG/DL (ref 7–18.7)
CALCIUM SERPL-MCNC: 9.5 MG/DL (ref 8.4–10.2)
CHLORIDE SERPL-SCNC: 107 MMOL/L (ref 98–107)
CLARITY UR: ABNORMAL
CO2 SERPL-SCNC: 25 MMOL/L (ref 22–29)
COLOR UR AUTO: YELLOW
CREAT SERPL-MCNC: 0.77 MG/DL (ref 0.55–1.02)
CREAT/UREA NIT SERPL: 16
EOSINOPHIL # BLD AUTO: 0.34 X10(3)/MCL (ref 0–0.9)
EOSINOPHIL NFR BLD AUTO: 2.8 %
ERYTHROCYTE [DISTWIDTH] IN BLOOD BY AUTOMATED COUNT: 13.9 % (ref 11.5–17)
GFR SERPLBLD CREATININE-BSD FMLA CKD-EPI: >60 ML/MIN/1.73/M2
GLOBULIN SER-MCNC: 4.4 GM/DL (ref 2.4–3.5)
GLUCOSE SERPL-MCNC: 96 MG/DL (ref 74–100)
GLUCOSE UR QL STRIP: NORMAL
HCT VFR BLD AUTO: 42.6 % (ref 37–47)
HGB BLD-MCNC: 14 G/DL (ref 12–16)
HGB UR QL STRIP: ABNORMAL
IMM GRANULOCYTES # BLD AUTO: 0.1 X10(3)/MCL (ref 0–0.04)
IMM GRANULOCYTES NFR BLD AUTO: 0.8 %
KETONES UR QL STRIP: NEGATIVE
LEUKOCYTE ESTERASE UR QL STRIP: NEGATIVE
LIPASE SERPL-CCNC: 27 U/L
LYMPHOCYTES # BLD AUTO: 3.17 X10(3)/MCL (ref 0.6–4.6)
LYMPHOCYTES NFR BLD AUTO: 26.1 %
MCH RBC QN AUTO: 30.1 PG (ref 27–31)
MCHC RBC AUTO-ENTMCNC: 32.9 G/DL (ref 33–36)
MCV RBC AUTO: 91.6 FL (ref 80–94)
MONOCYTES # BLD AUTO: 0.92 X10(3)/MCL (ref 0.1–1.3)
MONOCYTES NFR BLD AUTO: 7.6 %
MUCOUS THREADS URNS QL MICRO: ABNORMAL /LPF
NEUTROPHILS # BLD AUTO: 7.53 X10(3)/MCL (ref 2.1–9.2)
NEUTROPHILS NFR BLD AUTO: 62 %
NITRITE UR QL STRIP: NEGATIVE
NRBC BLD AUTO-RTO: 0 %
PH UR STRIP: 7.5 [PH]
PLATELET # BLD AUTO: 406 X10(3)/MCL (ref 130–400)
PMV BLD AUTO: 9.6 FL (ref 7.4–10.4)
POTASSIUM SERPL-SCNC: 4.1 MMOL/L (ref 3.5–5.1)
PROT SERPL-MCNC: 8.1 GM/DL (ref 6.4–8.3)
PROT UR QL STRIP: ABNORMAL
RBC # BLD AUTO: 4.65 X10(6)/MCL (ref 4.2–5.4)
RBC #/AREA URNS AUTO: ABNORMAL /HPF
SODIUM SERPL-SCNC: 139 MMOL/L (ref 136–145)
SP GR UR STRIP.AUTO: 1.02 (ref 1–1.03)
SQUAMOUS #/AREA URNS LPF: ABNORMAL /HPF
UROBILINOGEN UR STRIP-ACNC: NORMAL
WBC # BLD AUTO: 12.15 X10(3)/MCL (ref 4.5–11.5)
WBC #/AREA URNS AUTO: ABNORMAL /HPF

## 2025-04-11 PROCEDURE — 81001 URINALYSIS AUTO W/SCOPE: CPT | Performed by: PHYSICIAN ASSISTANT

## 2025-04-11 PROCEDURE — 96375 TX/PRO/DX INJ NEW DRUG ADDON: CPT

## 2025-04-11 PROCEDURE — 96374 THER/PROPH/DIAG INJ IV PUSH: CPT

## 2025-04-11 PROCEDURE — 80053 COMPREHEN METABOLIC PANEL: CPT | Performed by: PHYSICIAN ASSISTANT

## 2025-04-11 PROCEDURE — 63600175 PHARM REV CODE 636 W HCPCS: Performed by: NURSE PRACTITIONER

## 2025-04-11 PROCEDURE — 96376 TX/PRO/DX INJ SAME DRUG ADON: CPT

## 2025-04-11 PROCEDURE — 85025 COMPLETE CBC W/AUTO DIFF WBC: CPT | Performed by: PHYSICIAN ASSISTANT

## 2025-04-11 PROCEDURE — 83690 ASSAY OF LIPASE: CPT | Performed by: PHYSICIAN ASSISTANT

## 2025-04-11 PROCEDURE — 96372 THER/PROPH/DIAG INJ SC/IM: CPT | Performed by: NURSE PRACTITIONER

## 2025-04-11 PROCEDURE — 25500020 PHARM REV CODE 255: Performed by: PHYSICIAN ASSISTANT

## 2025-04-11 PROCEDURE — 99285 EMERGENCY DEPT VISIT HI MDM: CPT | Mod: 25

## 2025-04-11 PROCEDURE — 81025 URINE PREGNANCY TEST: CPT | Performed by: PHYSICIAN ASSISTANT

## 2025-04-11 RX ORDER — TRAMADOL HYDROCHLORIDE 50 MG/1
50 TABLET ORAL EVERY 8 HOURS PRN
Qty: 15 TABLET | Refills: 0 | Status: SHIPPED | OUTPATIENT
Start: 2025-04-11

## 2025-04-11 RX ORDER — ONDANSETRON HYDROCHLORIDE 2 MG/ML
4 INJECTION, SOLUTION INTRAVENOUS
Status: COMPLETED | OUTPATIENT
Start: 2025-04-11 | End: 2025-04-11

## 2025-04-11 RX ORDER — ONDANSETRON 4 MG/1
4 TABLET, ORALLY DISINTEGRATING ORAL EVERY 8 HOURS PRN
Qty: 20 TABLET | Refills: 0 | Status: SHIPPED | OUTPATIENT
Start: 2025-04-11

## 2025-04-11 RX ORDER — PROMETHAZINE HYDROCHLORIDE 25 MG/ML
25 INJECTION, SOLUTION INTRAMUSCULAR; INTRAVENOUS
Status: COMPLETED | OUTPATIENT
Start: 2025-04-11 | End: 2025-04-11

## 2025-04-11 RX ORDER — HYDROMORPHONE HYDROCHLORIDE 2 MG/ML
1 INJECTION, SOLUTION INTRAMUSCULAR; INTRAVENOUS; SUBCUTANEOUS
Refills: 0 | Status: COMPLETED | OUTPATIENT
Start: 2025-04-11 | End: 2025-04-11

## 2025-04-11 RX ORDER — KETOROLAC TROMETHAMINE 30 MG/ML
30 INJECTION, SOLUTION INTRAMUSCULAR; INTRAVENOUS
Status: COMPLETED | OUTPATIENT
Start: 2025-04-11 | End: 2025-04-11

## 2025-04-11 RX ADMIN — ONDANSETRON 4 MG: 2 INJECTION INTRAMUSCULAR; INTRAVENOUS at 06:04

## 2025-04-11 RX ADMIN — IOHEXOL 100 ML: 350 INJECTION, SOLUTION INTRAVENOUS at 04:04

## 2025-04-11 RX ADMIN — HYDROMORPHONE HYDROCHLORIDE 1 MG: 2 INJECTION, SOLUTION INTRAMUSCULAR; INTRAVENOUS; SUBCUTANEOUS at 07:04

## 2025-04-11 RX ADMIN — PROMETHAZINE HYDROCHLORIDE 25 MG: 25 INJECTION INTRAMUSCULAR; INTRAVENOUS at 08:04

## 2025-04-11 RX ADMIN — ONDANSETRON 4 MG: 2 INJECTION INTRAMUSCULAR; INTRAVENOUS at 08:04

## 2025-04-11 RX ADMIN — KETOROLAC TROMETHAMINE 30 MG: 30 INJECTION, SOLUTION INTRAMUSCULAR; INTRAVENOUS at 06:04

## 2025-04-11 NOTE — FIRST PROVIDER EVALUATION
"Medical screening examination initiated.  I have conducted a focused provider triage encounter, findings are as follows:  Chief Complaint   Patient presents with    Abdominal Pain     C/o L lower abdominal pain since this am. Had tubal ligation and ablation last week with JAMES Gaming MD. Denies fevers, bleeding, drainage from incision. + nausea       Brief history of present illness:  40 Year old female status post ligation and ablation last week presents to ED for evaluation of left lower abdominal pain since this morning.  Complains of nausea Denies any fever trauma bleeding or drainage from incision site.    Vitals:    04/11/25 1303   BP: 119/75   Pulse: 81   Resp: 17   Temp: 99.4 °F (37.4 °C)   TempSrc: Oral   SpO2: 97%   Weight: 127 kg (280 lb)   Height: 5' 11" (1.803 m)       Pertinent physical exam:  Patient is awake and alert and oriented.  Ambulatory to triage.  In no acute distress.        Brief workup plan:  labs, UA, IV    Preliminary workup initiated; this workup will be continued and followed by the physician or advanced practice provider that is assigned to the patient when roomed.  "

## 2025-04-11 NOTE — Clinical Note
"Madelyn Mirandaelle" Rajiv was seen and treated in our emergency department on 4/11/2025.  She may return to work on 04/15/2025.       If you have any questions or concerns, please don't hesitate to call.      Alena Flowers, JULIAP"

## 2025-04-11 NOTE — ED PROVIDER NOTES
Encounter Date: 4/11/2025       History     Chief Complaint   Patient presents with    Abdominal Pain     C/o L lower abdominal pain since this am. Had tubal ligation and ablation last week with JAMES Gaming MD. Denies fevers, bleeding, drainage from incision. + nausea     Patient states left lower abdominal pain starting this morning.  Patient states nausea.  Patient denies any fever, vomiting, diarrhea, constipation, or dysuria.  Patient states that 1 week ago she had a tubal ligation and ablation done.  Patient denies any drainage or redness from incision sites.    The history is provided by the patient.   Abdominal Pain  The current episode started today. The onset of the illness was gradual. The problem has not changed since onset.The abdominal pain is located in the LLQ. The abdominal pain does not radiate. The severity of the abdominal pain is 5/10. The abdominal pain is relieved by nothing. The abdominal pain is exacerbated by movement. The other symptoms of the illness include nausea. The other symptoms of the illness do not include fever, vomiting, diarrhea or dysuria.     Review of patient's allergies indicates:   Allergen Reactions    Aspirin Anaphylaxis, Nausea And Vomiting and Rash     Chemical burn, Throat swelling    Coconut Anaphylaxis    Pecan extract Nausea And Vomiting     Other Reaction(s): stomach upset    Shrimp Nausea And Vomiting     Other Reaction(s): stomach upset     Past Medical History:   Diagnosis Date    Anxiety and depression     Digestive disorder     Heart murmur     Hypertension     migraines     Obesity     BMI 39.05    Rhinosinusitis      Past Surgical History:   Procedure Laterality Date    APPENDECTOMY      HYSTEROSCOPY WITH HYDROTHERMAL ABLATION OF ENDOMETRIUM WITH DILATION AND CURETTAGE N/A 4/4/2025    Procedure: HYSTEROSCOPY, WITH DILATION AND CURETTAGE OF UTERUS AND HYDROTHERMAL ENDOMETRIAL ABLATION;  Surgeon: Haresh Gaming Jr., MD;  Location: [facility];  Service: OB/GYN;   Laterality: N/A;    LAPAROSCOPIC LIGATION OF FALLOPIAN TUBE N/A 4/4/2025    Procedure: LIGATION, FALLOPIAN TUBE, LAPAROSCOPIC;  Surgeon: Haresh Gaming Jr., MD;  Location: [facility] OR;  Service: OB/GYN;  Laterality: N/A;    REMOVAL OF INTRAUTERINE DEVICE (IUD)  4/4/2025    Procedure: REMOVAL, INTRAUTERINE DEVICE;  Surgeon: Haresh Gaming Jr., MD;  Location: [facility] OR;  Service: OB/GYN;;     Family History   Problem Relation Name Age of Onset    Tremor Mother      Kidney disease Mother      Diabetes Father       Social History[1]  Review of Systems   Constitutional: Negative.  Negative for fever.   HENT: Negative.     Eyes: Negative.    Respiratory: Negative.     Cardiovascular: Negative.    Gastrointestinal:  Positive for abdominal pain and nausea. Negative for diarrhea and vomiting.   Endocrine: Negative.    Genitourinary: Negative.  Negative for dysuria.   Musculoskeletal: Negative.    Skin: Negative.    Allergic/Immunologic: Negative.    Neurological: Negative.    Hematological: Negative.    Psychiatric/Behavioral: Negative.     All other systems reviewed and are negative.      Physical Exam     Initial Vitals [04/11/25 1303]   BP Pulse Resp Temp SpO2   119/75 81 17 99.4 °F (37.4 °C) 97 %      MAP       --         Physical Exam    Nursing note and vitals reviewed.  Constitutional: She appears well-developed and well-nourished. No distress.   HENT:   Head: Normocephalic and atraumatic. Mouth/Throat: Oropharynx is clear and moist.   Eyes: Conjunctivae and EOM are normal. Pupils are equal, round, and reactive to light.   Neck: Neck supple.   Normal range of motion.  Cardiovascular:  Normal rate, regular rhythm, normal heart sounds and intact distal pulses.           Pulmonary/Chest: Breath sounds normal. No respiratory distress. She has no wheezes.   Abdominal: Abdomen is soft. Bowel sounds are normal. She exhibits no distension. There is no abdominal tenderness.   Patient's laparoscopic surgical incisions x2 appear to  be well healing.  There is no redness, swelling, drainage, or warmth.   Musculoskeletal:         General: No tenderness or edema. Normal range of motion.      Cervical back: Normal range of motion and neck supple.     Neurological: She is alert and oriented to person, place, and time. She has normal strength. GCS score is 15. GCS eye subscore is 4. GCS verbal subscore is 5. GCS motor subscore is 6.   Skin: Skin is warm and dry. No rash noted.   Psychiatric: She has a normal mood and affect. Thought content normal.         ED Course   Procedures  Labs Reviewed   COMPREHENSIVE METABOLIC PANEL - Abnormal       Result Value    Sodium 139      Potassium 4.1      Chloride 107      CO2 25      Glucose 96      Blood Urea Nitrogen 12.1      Creatinine 0.77      Calcium 9.5      Protein Total 8.1      Albumin 3.7      Globulin 4.4 (*)     Albumin/Globulin Ratio 0.8 (*)     Bilirubin Total 0.5      ALP 72      ALT 16      AST 11      eGFR >60      Anion Gap 7.0      BUN/Creatinine Ratio 16     URINALYSIS, REFLEX TO URINE CULTURE - Abnormal    Color, UA Yellow      Appearance, UA Turbid (*)     Specific Gravity, UA 1.023      pH, UA 7.5      Protein, UA 1+ (*)     Glucose, UA Normal      Ketones, UA Negative      Blood, UA Trace (*)     Bilirubin, UA Negative      Urobilinogen, UA Normal      Nitrites, UA Negative      Leukocyte Esterase, UA Negative      RBC, UA 11-20 (*)     WBC, UA 0-5      Bacteria, UA None Seen      Squamous Epithelial Cells, UA Trace      Mucous, UA Trace (*)     Amorphous Crystal, UA Moderate (*)    CBC WITH DIFFERENTIAL - Abnormal    WBC 12.15 (*)     RBC 4.65      Hgb 14.0      Hct 42.6      MCV 91.6      MCH 30.1      MCHC 32.9 (*)     RDW 13.9      Platelet 406 (*)     MPV 9.6      Neut % 62.0      Lymph % 26.1      Mono % 7.6      Eos % 2.8      Basophil % 0.7      Imm Grans % 0.8      Neut # 7.53      Lymph # 3.17      Mono # 0.92      Eos # 0.34      Baso # 0.09      Imm Gran # 0.10 (*)     NRBC%  0.0     LIPASE - Normal    Lipase Level 27     PREGNANCY TEST, URINE RAPID - Normal    hCG Qualitative, Urine Negative     CBC W/ AUTO DIFFERENTIAL    Narrative:     The following orders were created for panel order CBC auto differential.  Procedure                               Abnormality         Status                     ---------                               -----------         ------                     CBC with Differential[[id number]]       Abnormal            Final result                 Please view results for these tests on the individual orders.          Imaging Results              US PELVIS COMPLETE NON OB WITH DOPPLER (XPD) (Final result)  Result time 04/11/25 19:12:38   Procedure changed from US Pelvis Complete Non OB     Final result by Waqas Otero MD (04/11/25 19:12:38)                   Impression:      1.  Small amount of fluid within the endometrium and small free fluid within the pelvis.  2.  Bilateral unremarkable arteriovenous flow to the ovaries.      Electronically signed by: Waqas Otero  Date:    04/11/2025  Time:    19:12               Narrative:    EXAMINATION:  Ultrasound pelvis complete non OB with Doppler    CLINICAL HISTORY:  Ligation an ablation performed April 4, 2025.  Intrauterine device was also removed according to the patient.    TECHNIQUE:  Multiple transabdominal transvaginal real-time images were performed of the pelvis various planes by the sonographer.    COMPARISON:  CT abdomen pelvis same date.    FINDINGS:  Uterus measures 10.0 x 5.5 x 6.2 cm.  There is small amount of fluid which is seen within the endometrium.  Uterine myometrial echotexture is unremarkable.  There is small amount of pelvic free fluid    Right ovary measures 3.4 x 2.1 x 2.8 cm and contains a small hypoechoic structure measuring 2.0 x 1.5 x 1.5 cm.  There is unremarkable arteriovenous flow to the right ovary.    Left ovary measures 3.0 x 1.5 x 3.3 cm and is unremarkable.  There is also  unremarkable arteriovenous flow to the ovary.                                       CT Abdomen Pelvis With IV Contrast NO Oral Contrast (Final result)  Result time 04/11/25 16:44:36      Final result by Jojo Pratt MD (04/11/25 16:44:36)                   Impression:      1. Small volume simple free fluid, presumed physiologic.  Physiologic right ovary corpus luteal cyst.  2. Diverticulosis without evidence of acute diverticulitis.      Electronically signed by: Jojo Pratt  Date:    04/11/2025  Time:    16:44               Narrative:    EXAMINATION:  CT ABDOMEN PELVIS WITH IV CONTRAST    CLINICAL HISTORY:  Abdominal pain, post-op;Flank pain, kidney stone suspected;LLQ abdominal pain;LLQ abdominal pain, hematuria;    TECHNIQUE:  Helically acquired images with axial, sagittal and coronal reformations were obtained from the lung bases to the pubic symphysis after the IV administration of contrast.    Automated tube current modulation, weight-based exposure dosing, and/or iterative reconstruction technique utilized to reach lowest reasonably achievable exposure rate.    DLP: 2183 mGy*cm    COMPARISON:  No relevant prior available for comparison at the time of dictation.    FINDINGS:  HEART: Normal in size. No pericardial effusion.    LUNG BASES: Well aerated.    LIVER: Unremarkable    BILIARY: No calcified gallstones.    PANCREAS: No inflammatory change.    SPLEEN: Normal in size    ADRENALS: No mass.    KIDNEYS/URETERS: The kidneys enhance symmetrically.  No hydronephrosis.    GI TRACT/MESENTERY:  No evidence of bowel obstruction or inflammation.     Colonic diverticulosis without acute inflammatory change.    PERITONEUM: Small volume pelvic free fluid.No free air.    LYMPH NODES: No enlarged lymph nodes by size criteria.    VASCULATURE: No significant atherosclerosis or aneurysm.    BLADDER: Nondistended bladder limits CT evaluation    REPRODUCTIVE ORGANS: Physiologic right ovary corpus luteal  cyst.    SOFT TISSUES: Small fat containing umbilical hernia.    BONES: No acute osseous abnormality.                                       Medications   iohexoL (OMNIPAQUE 350) injection 100 mL (100 mLs Intravenous Given 4/11/25 1626)   ketorolac injection 30 mg (30 mg Intravenous Given 4/11/25 1824)   ondansetron injection 4 mg (4 mg Intravenous Given 4/11/25 1824)   HYDROmorphone (PF) injection 1 mg (1 mg Intravenous Given 4/11/25 1954)   ondansetron injection 4 mg (4 mg Intravenous Given 4/11/25 2003)   promethazine injection 25 mg (25 mg Intramuscular Given 4/11/25 2047)     Medical Decision Making  Patient states left lower abdominal pain starting this morning.  Patient states nausea.  Patient denies any fever, vomiting, diarrhea, constipation, or dysuria.  Patient states that 1 week ago she had a tubal ligation and ablation done.  Patient denies any drainage or redness from incision sites.    The history is provided by the patient.   Abdominal Pain  The current episode started today. The onset of the illness was gradual. The problem has not changed since onset.The abdominal pain is located in the LLQ. The abdominal pain does not radiate. The severity of the abdominal pain is 5/10. The abdominal pain is relieved by nothing. The abdominal pain is exacerbated by movement. The other symptoms of the illness include nausea. The other symptoms of the illness do not include fever, vomiting, diarrhea or dysuria.       Amount and/or Complexity of Data Reviewed  Labs: ordered. Decision-making details documented in ED Course.  Radiology: ordered. Decision-making details documented in ED Course.  Discussion of management or test interpretation with external provider(s): Differential diagnosis (including but not limited to):   Judging by the patient's chief complaint and pertinent history, the patient has the following possible differential diagnoses, including but not limited to the following.  Some of these are deemed to  be lower likelihood and some more likely based on my physical exam and history combined with possible lab work and/or imaging studies.   Please see the pertinent studies, and refer to the HPI.  Some of these diagnoses will take further evaluation to fully rule out, perhaps as an outpatient and the patient was encouraged to follow up when discharged for more comprehensive evaluation.  Abdominal pain, diverticulitis, ovarian cyst, ovarian torsion, abscess   Patient's labs are overall unremarkable.  Patient's CT scan of her abdomen and pelvis does not show any acute change.  Patient's ultrasound of her pelvis does not show any acute change.  Discussed results with patient.  Patient was given pain medication in the ED.  Patient states that her pain has been relieved.  We will discharge patient with pain control.  Patient was given strict ED return precautions.      Risk  Prescription drug management.                                      Clinical Impression:  Final diagnoses:  [R10.30] Lower abdominal pain (Primary)          ED Disposition Condition    Discharge Stable          ED Prescriptions       Medication Sig Dispense Start Date End Date Auth. Provider    traMADoL (ULTRAM) 50 mg tablet Take 1 tablet (50 mg total) by mouth every 8 (eight) hours as needed for Pain. 15 tablet 4/11/2025 -- Alena Flowers FNP    ondansetron (ZOFRAN-ODT) 4 MG TbDL Take 1 tablet (4 mg total) by mouth every 8 (eight) hours as needed (Nausea). 20 tablet 4/11/2025 -- Alena Flowers FNP          Follow-up Information       Follow up With Specialties Details Why Contact Info    Harseh Gaming Jr., MD Obstetrics and Gynecology In 3 days  [address] [facility] [address]  [phone]      Lilia Otto NP Family Medicine In 3 days  [address] [facility] [address]  [phone]                   [1]   Social History  Tobacco Use    Smoking status: Never    Smokeless tobacco: Never    Substance Use Topics    Alcohol use: Not Currently    Drug use: Yes     Frequency: 3.0 times per week     Types: Marijuana     Comment: medical marijuana Alena Mancera, P  04/11/25 [id number]

## 2025-04-17 ENCOUNTER — PATIENT MESSAGE (OUTPATIENT)
Dept: NEUROLOGY | Facility: CLINIC | Age: 40
End: 2025-04-17
Payer: MEDICAID

## 2025-04-17 ENCOUNTER — HOSPITAL ENCOUNTER (OUTPATIENT)
Dept: RADIOLOGY | Facility: HOSPITAL | Age: 40
Discharge: HOME | End: 2025-04-17
Attending: NURSE PRACTITIONER
Payer: MEDICAID

## 2025-04-17 DIAGNOSIS — M54.2 NECK PAIN: Chronic | ICD-10-CM

## 2025-04-17 PROCEDURE — 72052 X-RAY EXAM NECK SPINE 6/>VWS: CPT | Mod: TC

## 2025-04-22 ENCOUNTER — TELEPHONE (OUTPATIENT)
Dept: NEUROLOGY | Facility: CLINIC | Age: 40
End: 2025-04-22
Payer: MEDICAID

## 2025-04-22 ENCOUNTER — RESULTS FOLLOW-UP (OUTPATIENT)
Dept: NEUROLOGY | Facility: CLINIC | Age: 40
End: 2025-04-22

## 2025-04-22 DIAGNOSIS — M54.2 NECK PAIN: Primary | ICD-10-CM

## 2025-04-22 DIAGNOSIS — G43.E11 CHRONIC MIGRAINE WITH AURA, INTRACTABLE, WITH STATUS MIGRAINOSUS: Chronic | ICD-10-CM

## 2025-04-22 RX ORDER — GABAPENTIN 300 MG/1
300 CAPSULE ORAL 2 TIMES DAILY
Qty: 90 CAPSULE | Refills: 4 | Status: SHIPPED | OUTPATIENT
Start: 2025-04-22 | End: 2026-04-22

## 2025-04-22 NOTE — TELEPHONE ENCOUNTER
Spoke w/Pt regarding X-ray C-spine and discussed referral to [facility] physical therapy. Pt in agreement. Will increase tizanidine to 4mg nightly as 2mg effects are short lived.

## 2025-04-27 ENCOUNTER — PATIENT MESSAGE (OUTPATIENT)
Dept: NEUROLOGY | Facility: CLINIC | Age: 40
End: 2025-04-27
Payer: MEDICAID

## 2025-04-28 DIAGNOSIS — M54.2 NECK PAIN: Primary | ICD-10-CM

## 2025-05-09 ENCOUNTER — CLINICAL SUPPORT (OUTPATIENT)
Dept: REHABILITATION | Facility: HOSPITAL | Age: 40
End: 2025-05-09
Attending: NURSE PRACTITIONER
Payer: MEDICAID

## 2025-05-09 ENCOUNTER — LAB VISIT (OUTPATIENT)
Dept: LAB | Facility: HOSPITAL | Age: 40
End: 2025-05-09
Attending: NURSE PRACTITIONER
Payer: MEDICAID

## 2025-05-09 DIAGNOSIS — I10 ESSENTIAL HYPERTENSION, MALIGNANT: Primary | ICD-10-CM

## 2025-05-09 DIAGNOSIS — R20.2 NUMBNESS AND TINGLING IN BOTH HANDS: ICD-10-CM

## 2025-05-09 DIAGNOSIS — G43.909 MIGRAINE WITHOUT STATUS MIGRAINOSUS, NOT INTRACTABLE, UNSPECIFIED MIGRAINE TYPE: ICD-10-CM

## 2025-05-09 DIAGNOSIS — R29.3 POSTURE ABNORMALITY: ICD-10-CM

## 2025-05-09 DIAGNOSIS — R20.0 NUMBNESS AND TINGLING IN BOTH HANDS: ICD-10-CM

## 2025-05-09 DIAGNOSIS — M54.2 TENDERNESS OF NECK: ICD-10-CM

## 2025-05-09 DIAGNOSIS — R00.2 PALPITATIONS: ICD-10-CM

## 2025-05-09 DIAGNOSIS — M54.2 NECK PAIN: Primary | ICD-10-CM

## 2025-05-09 LAB
CHOLEST SERPL-MCNC: 180 MG/DL
CHOLEST/HDLC SERPL: 4 {RATIO} (ref 0–5)
HDLC SERPL-MCNC: 46 MG/DL (ref 35–60)
LDLC SERPL CALC-MCNC: 118 MG/DL (ref 50–140)
T4 FREE SERPL-MCNC: 0.92 NG/DL (ref 0.7–1.48)
TRIGL SERPL-MCNC: 79 MG/DL (ref 37–140)
TSH SERPL-ACNC: 1.42 UIU/ML (ref 0.35–4.94)
VLDLC SERPL CALC-MCNC: 16 MG/DL

## 2025-05-09 PROCEDURE — 97162 PT EVAL MOD COMPLEX 30 MIN: CPT

## 2025-05-09 PROCEDURE — 84443 ASSAY THYROID STIM HORMONE: CPT

## 2025-05-09 PROCEDURE — 80061 LIPID PANEL: CPT

## 2025-05-09 PROCEDURE — 36415 COLL VENOUS BLD VENIPUNCTURE: CPT

## 2025-05-09 PROCEDURE — 84439 ASSAY OF FREE THYROXINE: CPT

## 2025-05-09 NOTE — PROGRESS NOTES
Outpatient Rehab    Physical Therapy Evaluation (only)    Patient Name: Madelyn Vance  MRN: [id number]  YOB: 1985  Encounter Date: 5/9/2025    Therapy Diagnosis:   Encounter Diagnoses   Name Primary?    Neck pain Yes    Posture abnormality     Migraine without status migrainosus, not intractable, unspecified migraine type     Tenderness of neck     Numbness and tingling in both hands      Physician: Parvin Pitts, ANP    Physician Orders: Eval and Treat  Medical Diagnosis: Neck pain    Visit # / Visits Authorized:  1 / 1  Insurance Authorization Period: 5/9/2025 to 5/7/2026  Date of Evaluation: 5/9/2025  Plan of Care Certification: 5/9/2025 to TBD     Time In: 0845   Time Out: 0935  Total Time (in minutes): 50   Total Billable Time (in minutes):      Intake Outcome Measure for FOTO Survey    Therapist reviewed FOTO scores for Madelyn Vance on 5/9/2025.   FOTO report - see Media section or FOTO account episode details.     Intake Score: 44%         Subjective   History of Present Illness  Madelyn is a 40 y.o. female who reports to physical therapy with a chief concern of left sided neck pain.     The patient reports a medical diagnosis of M54.2- Neck pain.    Diagnostic tests related to this condition: X-ray.   X-Ray Details: 3/28/25: straightening of the normal curvature of the cervical spine minimal degenerative changes are seen posteriorly at C4-C5 and C5-C6.  No acute fractures or dislocations identified the prevertebral soft tissues appear to be unremarkable.  There is no evidence of neural foraminal encroachment    Dominant Hand: Right  History of Present Condition/Illness: Pt reports she has been having pain in upper back and constant migraine HA since she was a teenager mostly due to large breasts. She started having pain on left side of her neck and shoulder radiating into L UE for about 2-3 months for unknown reason without injury, she woke up with pain. She has pain throughout L UE  and intermittent numbness in 1st 3 digits but has been having numbness in B 2nd, 3rd, and 4th digits causing difficulty holding objects, occasionally dropping things. Sometimes she has to hang her head because her head feels so heavy. She is only able to sleep in her Right side at night. She works part-time mostly sitting at a computer. She had PT in 2020 with minimal relief. She is Independent with all ADLs and IADLs. She takes Ibuprofen and Sumatriptan as needed, Tizanidine, and Gabapentin.    Activities of Daily Living  Social history was obtained from Patient.          Patient Roles: Caregiver for child  Patient Responsibilities: Community mobility, Driving, Financial management, Health management, Home management, Laundry, Meal prep, Shopping, Personal ADL    Previously independent with activities of daily living? Yes     Currently independent with activities of daily living? Yes          Previously independent with instrumental activities of daily living? Yes     Currently independent with instrumental activities of daily living? Yes              Pain     Patient reports a current pain level of 7/10. Pain at best is reported as 4/10. Pain at worst is reported as 10/10.   Location: left side of neck, migraine HA  Clinical Progression (since onset): Stable  Pain Qualities: Aching, Dull, Sharp, Burning, Other (Comment)  Other Pain Qualities: numbness /tingling, deep  Pain-Relieving Factors: Other (Comment)  Other Pain-Relieving Factors: nothing  Pain-Aggravating Factors: Driving, Sitting, Lying down         Treatment History  Treatments  Previously Received Treatments: Yes  Previous Treatments: Physical therapy  Currently Receiving Treatments: No    Living Arrangements  Living Situation  Housing: Home independently  Living Arrangements: Spouse/significant other, Children  Support Systems: Spouse/significant other, Children        Employment  Patient reports: Does the patient's condition impact their ability to  work?  Employment Status: Employed part-time   Works primarily at a computer      Past Medical History/Physical Systems Review:   Madelyn Vance  has a past medical history of Anxiety and depression, Digestive disorder, Heart murmur, Hypertension, migraines, Obesity, and Rhinosinusitis.    Madelyn Vance  has a past surgical history that includes Appendectomy; Laparoscopic ligation of fallopian tube (N/A, 4/4/2025); Hysteroscopy with hydrothermal ablation of endometrium with dilation and curettage (N/A, 4/4/2025); and Removal of intrauterine device (IUD) (4/4/2025).    Madelyn has a current medication list which includes the following prescription(s): alprazolam, azelastine, cholecalciferol (vitamin d3), escitalopram oxalate, fluticasone propionate, gabapentin, ketorolac, lisinopril, loratadine, metoclopramide hcl, montelukast, multivitamin, nystatin, olopatadine, omeprazole, ondansetron, oxycodone-acetaminophen, saccharomyces boulardii, sumatriptan, tizanidine, tramadol, and trazodone.    Review of patient's allergies indicates:   Allergen Reactions    Aspirin Anaphylaxis, Nausea And Vomiting and Rash     Chemical burn, Throat swelling    Coconut Anaphylaxis    Pecan extract Nausea And Vomiting     Other Reaction(s): stomach upset    Shrimp Nausea And Vomiting     Other Reaction(s): stomach upset        Objective   Posture  Patient presents with a Forward head position. Increased thoracic kyphosis is observed.   Shoulders are Rounded.             Spinal Muscle Palpation     Increased tenderness throughout cervical and thoracic B paraspinal muscles, upper and middle traps            Vertebral Palpation       Increased tenderness with PA glides to spinous and transverse process of cervical and thoracic spine        Subcranial Range of Motion   Active Restricted? Passive Restricted? Pain   Flexion         Protraction         Retraction           Cervical Range of Motion   Active (deg) Passive (deg) Pain    Flexion 100   Yes   Extension 100   Yes   Right Lateral Flexion 100   Yes   Right Rotation 100   Yes   Left Lateral Flexion 100       Left Rotation 100   Yes     Movement is based on percentage of available ROM. Pain primarily on left side of cervical spine with all movements    Shoulder Range of Motion  Right Shoulder   Active (deg) Passive (deg) Pain   Flexion 180       Extension         Scaption         ABduction 180       ADduction         Horizontal ABduction         Horizontal ADduction         External Rotation (Shoulder ABducted 0 degrees)         External Rotation (Shoulder ABducted 45 degrees)         External Rotation (Shoulder ABducted 90 degrees)         Internal Rotation (Shoulder ABducted 0 degrees)         Internal Rotation (Shoulder ABducted 45 degrees)         Internal Rotation (Shoulder ABducted 90 degrees)           Left Shoulder   Active (deg) Passive (deg) Pain   Flexion 180       Extension         Scaption         ABduction 180       ADduction         Horizontal ABduction         Horizontal ADduction         External Rotation (Shoulder ABducted 0 degrees)         External Rotation (Shoulder ABducted 45 degrees)         External Rotation (Shoulder ABducted 90 degrees)         Internal Rotation (Shoulder ABducted 0 degrees)         Internal Rotation (Shoulder ABducted 45 degrees)         Internal Rotation (Shoulder ABducted 90 degrees)                       Shoulder Strength - Planes of Motion   Right Strength Right Pain Left Strength Left  Pain   Flexion 4+   4+     Extension           ABduction 4+   4+     ADduction           Horizontal ABduction           Horizontal ADduction           Internal Rotation 0° 4+   4+     Internal Rotation 90°           External Rotation 0° 4+   4+     External Rotation 90°               Elbow Strength   Right Strength Right Pain Left Strength Left  Pain   Flexion (C6) 4+   4+     Extension (C7)                  Right  Strength  Right Hand Dynamometer  Position: 2  Elbow Position Forearm Position Trial 1 (lbs) Trial 2  (lbs) Trial 3  (lbs) Average  (lbs) Pain   Flexed Neutral 59               Left  Strength  Left Hand Dynamometer Position: 2  Elbow Position Forearm Position Trial 1 (lbs) Trial 2 (lbs) Trial 3 (lbs) Average (lbs) Pain   Flexed Neutral 51                         Time Entry(in minutes):  PT Evaluation (Moderate) Time Entry: 50    Assessment & Plan   Assessment  Madelyn presents with a condition of Moderate complexity.   Presentation of Symptoms: Stable  Will Comorbidities Impact Care: Yes  Obesity, large breasts    Functional Limitations: Carrying objects, Completing work/school activities, Disrupted sleep pattern, Driving, Pain with ADLs/IADLs, Participating in leisure activities, Performing household chores, Sitting tolerance  Impairments: Pain with functional activity  Personal Factors Affecting Prognosis: Pain    Patient Goal for Therapy (PT): reduce pain in neck and upper back  Prognosis: Good  Assessment Details: Pt presents with pain and tenderness in cervical and thoracic spine area, radiating symptoms in B UE L > R, decreased  strength in L UE, migraine HA, posture deficits, and elevated BMI and large breasts increasing anterior pull on spine all affecting tolerance with daily activities. Manual cervical traction increased pt's pain. Pt would benefit from PT services to address pt's deficits     Plan  From a physical therapy perspective, the patient would benefit from: Skilled Rehab Services    Planned therapy interventions include: Therapeutic exercise, Manual therapy, and Neuromuscular re-education.            Visit Frequency: 2 times Per Week for 6 Weeks.       This plan was discussed with Patient.   Discussion participants: Agreed Upon Plan of Care  Plan details: Discussed attendance policy including attending 80% of scheduled PT visits and no show policy (discharge from PT after 3 no shows for scheduled visits without canceling  or rescheduling visit)           Patient's spiritual, cultural, and educational needs considered and patient agreeable to plan of care and goals.     Education  Education was done with Patient.           - Written HEP issued with exercises and stretches demonstrated and performed. - importance and benefits of performing HEP daily for optimal improvements even after discharged from PT - importance of maintaining good cervical and thoracic posture with daily activities to reduce stress on spine - importance of stretching anterior chest muscles and strengthening posterior upper back muscles to improve posture - anatomy of cervical spine and how deficits cause pain and limit function        Goals:   Active       PT goals:        Reduce c/o pain in cervical and thoracic spine to <3/10 to improve tolerance with daily activities       Start:  05/09/25    Expected End:  06/27/25             Improve cervical and upper thoracic posture to reduce symptoms of numbness and weakness in B UE       Start:  05/09/25    Expected End:  06/27/25             Improve L  strength by 5 lbs to reduce difficulty holding / dropping objects       Start:  05/09/25    Expected End:  06/27/25             Pt will demonstrate knowledge and independence with HEP to continue with exercises outside of therapy to facilitate optimal overall improvements       Start:  05/09/25    Expected End:  06/27/25            Pt will improve functional score on cervical FOTO by >20 points which indicates improved ability to perform daily tasks with less difficulty and pain        Start:  05/09/25    Expected End:  06/27/25                Herlinda Garcia, PT

## 2025-05-19 ENCOUNTER — CLINICAL SUPPORT (OUTPATIENT)
Dept: REHABILITATION | Facility: HOSPITAL | Age: 40
End: 2025-05-19
Attending: NURSE PRACTITIONER
Payer: MEDICAID

## 2025-05-19 DIAGNOSIS — R20.2 NUMBNESS AND TINGLING IN BOTH HANDS: ICD-10-CM

## 2025-05-19 DIAGNOSIS — G43.909 MIGRAINE WITHOUT STATUS MIGRAINOSUS, NOT INTRACTABLE, UNSPECIFIED MIGRAINE TYPE: ICD-10-CM

## 2025-05-19 DIAGNOSIS — M54.2 NECK PAIN: ICD-10-CM

## 2025-05-19 DIAGNOSIS — R20.0 NUMBNESS AND TINGLING IN BOTH HANDS: ICD-10-CM

## 2025-05-19 DIAGNOSIS — R29.3 POSTURE ABNORMALITY: Primary | ICD-10-CM

## 2025-05-19 DIAGNOSIS — M54.2 TENDERNESS OF NECK: ICD-10-CM

## 2025-05-19 PROCEDURE — 97140 MANUAL THERAPY 1/> REGIONS: CPT | Mod: CQ

## 2025-05-19 PROCEDURE — 97110 THERAPEUTIC EXERCISES: CPT | Mod: CQ

## 2025-05-19 NOTE — PROGRESS NOTES
"  Outpatient Rehab    Physical Therapy Visit    Patient Name: Madelyn Vance  MRN: [id number]  YOB: 1985  Encounter Date: 5/19/2025    Therapy Diagnosis:   Encounter Diagnoses   Name Primary?    Neck pain     Posture abnormality Yes    Migraine without status migrainosus, not intractable, unspecified migraine type     Tenderness of neck     Numbness and tingling in both hands      Physician: Parvin Pitts, ANP    Physician Orders: Eval and Treat  Medical Diagnosis: Neck pain    Visit # / Visits Authorized:  1 / 12  Insurance Authorization Period: 5/9/2025 to 7/8/2025  Date of Evaluation: 5/9/2025  Plan of Care Certification: 5/9/2025 to 6/27/2025      PT/PTA: PTA   Number of PTA visits since last PT visit:1  Time In: 0800   Time Out: 0845  Total Time (in minutes): 45   Total Billable Time (in minutes): 45    FOTO:  Intake Score:  %  Survey Score 2:  %  Survey Score 3:  %    Precautions:       Subjective   Pt reports "everything is acting up today". She has not started HEP yet due to fear she would make things worse without guidance..  Pain reported as 4/10. neck / thoracic spine    Objective            Treatment:  Therapeutic Exercise  TE 1: Theraband Strengthening exercises (see flow sheet)  TE 2: Exercises to promote cervical / thoracic ROM and functional mobility  TE 3: Exercises to facilitate improved posture  TE 4: Guided stretching of cervical / thoracic musculature  Manual Therapy  MT 1: Soft tissue mobs to cervical musculature / suboccipital release  MT 2: Manual cervical traction      Time Entry(in minutes):  Manual Therapy Time Entry: 10  Therapeutic Exercise Time Entry: 35    Assessment & Plan   Assessment: Madelyn demonstrated a positive response to the initiation of stretching, strengthening and posture exercises, as evidenced by ability to complete all assigned repetitions with good effort and minimal report of increased pain. Some mild dizziness reprted when performing stretches and " chin tucks, but tolerable. Some relief noted after STM and manual traction.  Evaluation/Treatment Tolerance: Patient tolerated treatment well    Patient will continue to benefit from skilled outpatient physical therapy to address the deficits listed in the problem list box on initial evaluation, provide pt/family education and to maximize pt's level of independence in the home and community environment.     Patient's spiritual, cultural, and educational needs considered and patient agreeable to plan of care and goals.           Plan: Continue with current POC and progress as tolerated.    Goals:   Active       PT goals:        Reduce c/o pain in cervical and thoracic spine to <3/10 to improve tolerance with daily activities (Progressing)       Start:  05/09/25    Expected End:  06/27/25             Improve cervical and upper thoracic posture to reduce symptoms of numbness and weakness in B UE (Progressing)       Start:  05/09/25    Expected End:  06/27/25             Improve L  strength by 5 lbs to reduce difficulty holding / dropping objects (Progressing)       Start:  05/09/25    Expected End:  06/27/25             Pt will demonstrate knowledge and independence with HEP to continue with exercises outside of therapy to facilitate optimal overall improvements (Progressing)       Start:  05/09/25    Expected End:  06/27/25            Pt will improve functional score on cervical FOTO by >20 points which indicates improved ability to perform daily tasks with less difficulty and pain  (Progressing)       Start:  05/09/25    Expected End:  06/27/25                Morro Tidwell, PTA

## 2025-05-23 ENCOUNTER — CLINICAL SUPPORT (OUTPATIENT)
Dept: REHABILITATION | Facility: HOSPITAL | Age: 40
End: 2025-05-23
Attending: NURSE PRACTITIONER
Payer: MEDICAID

## 2025-05-23 DIAGNOSIS — R20.2 NUMBNESS AND TINGLING IN BOTH HANDS: ICD-10-CM

## 2025-05-23 DIAGNOSIS — R20.0 NUMBNESS AND TINGLING IN BOTH HANDS: ICD-10-CM

## 2025-05-23 DIAGNOSIS — G43.909 MIGRAINE WITHOUT STATUS MIGRAINOSUS, NOT INTRACTABLE, UNSPECIFIED MIGRAINE TYPE: ICD-10-CM

## 2025-05-23 DIAGNOSIS — R29.3 POSTURE ABNORMALITY: Primary | ICD-10-CM

## 2025-05-23 DIAGNOSIS — M54.2 TENDERNESS OF NECK: ICD-10-CM

## 2025-05-23 PROCEDURE — 97140 MANUAL THERAPY 1/> REGIONS: CPT | Mod: CQ

## 2025-05-23 PROCEDURE — 97110 THERAPEUTIC EXERCISES: CPT | Mod: CQ

## 2025-05-23 NOTE — PROGRESS NOTES
Outpatient Rehab    Physical Therapy Visit    Patient Name: Madelyn Vance  MRN: [id number]  YOB: 1985  Encounter Date: 5/23/2025    Therapy Diagnosis:   Encounter Diagnoses   Name Primary?    Posture abnormality Yes    Migraine without status migrainosus, not intractable, unspecified migraine type     Tenderness of neck     Numbness and tingling in both hands      Physician: Parvin Pitts, ANP    Physician Orders: Eval and Treat  Medical Diagnosis: Neck pain    Visit # / Visits Authorized:  2 / 12  Insurance Authorization Period: 5/9/2025 to 7/8/2025  Date of Evaluation: 5/9/2025  Plan of Care Certification: 5/9/2025 to 6/27/2025      PT/PTA: PTA   Number of PTA visits since last PT visit:2  Time In: 0845   Time Out: 0930  Total Time (in minutes): 45   Total Billable Time (in minutes): 45    FOTO:  Intake Score:  %  Survey Score 2:  %  Survey Score 3:  %    Precautions:       Subjective   Pt reports she has a migraine HA today. States she was a little sore after last session, but not bad..  Pain reported as 9/10. neck / thoracic spine    Objective            Treatment:  Therapeutic Exercise  TE 1: Theraband Strengthening exercises (see flow sheet)  TE 2: Exercises to promote cervical / thoracic ROM and functional mobility  TE 3: Exercises to facilitate improved posture  TE 4: Guided stretching of cervical / thoracic musculature  TE 5: Supine UE strengthening with Thera-band  TE 6: YTA's on SB  Manual Therapy  MT 1: Soft tissue mobs to cervical musculature / suboccipital release  MT 2: Manual cervical traction      Time Entry(in minutes):  Manual Therapy Time Entry: 10  Therapeutic Exercise Time Entry: 35    Assessment & Plan   Assessment: Madelyn demonstrated a positive response to session today, as evidenced by ability to complete additional UE exercises with good effort and minimal report of increased pain. No trouble with supine strengthening today. Some relief noted after STM and manual  traction.  Evaluation/Treatment Tolerance: Patient tolerated treatment well    The patient will continue to benefit from skilled outpatient physical therapy in order to address the deficits listed in the problem list on the initial evaluation, provide patient and family education, and maximize the patients level of independence in the home and community environments.     The patient's spiritual, cultural, and educational needs were considered, and the patient is agreeable to the plan of care and goals.           Plan: Continue with current POC and progress as tolerated.    Goals:   Active       PT goals:        Reduce c/o pain in cervical and thoracic spine to <3/10 to improve tolerance with daily activities (Progressing)       Start:  05/09/25    Expected End:  06/27/25             Improve cervical and upper thoracic posture to reduce symptoms of numbness and weakness in B UE (Progressing)       Start:  05/09/25    Expected End:  06/27/25             Improve L  strength by 5 lbs to reduce difficulty holding / dropping objects (Progressing)       Start:  05/09/25    Expected End:  06/27/25             Pt will demonstrate knowledge and independence with HEP to continue with exercises outside of therapy to facilitate optimal overall improvements (Progressing)       Start:  05/09/25    Expected End:  06/27/25            Pt will improve functional score on cervical FOTO by >20 points which indicates improved ability to perform daily tasks with less difficulty and pain  (Progressing)       Start:  05/09/25    Expected End:  06/27/25                Morro Tidwell, PTA

## 2025-05-26 ENCOUNTER — CLINICAL SUPPORT (OUTPATIENT)
Dept: REHABILITATION | Facility: HOSPITAL | Age: 40
End: 2025-05-26
Attending: NURSE PRACTITIONER
Payer: MEDICAID

## 2025-05-26 DIAGNOSIS — R29.3 POSTURE ABNORMALITY: Primary | ICD-10-CM

## 2025-05-26 DIAGNOSIS — M54.2 TENDERNESS OF NECK: ICD-10-CM

## 2025-05-26 DIAGNOSIS — R20.2 NUMBNESS AND TINGLING IN BOTH HANDS: ICD-10-CM

## 2025-05-26 DIAGNOSIS — R20.0 NUMBNESS AND TINGLING IN BOTH HANDS: ICD-10-CM

## 2025-05-26 DIAGNOSIS — M54.2 NECK PAIN: ICD-10-CM

## 2025-05-26 DIAGNOSIS — G43.909 MIGRAINE WITHOUT STATUS MIGRAINOSUS, NOT INTRACTABLE, UNSPECIFIED MIGRAINE TYPE: ICD-10-CM

## 2025-05-26 PROCEDURE — 97110 THERAPEUTIC EXERCISES: CPT | Mod: CQ

## 2025-05-26 PROCEDURE — 97140 MANUAL THERAPY 1/> REGIONS: CPT | Mod: CQ

## 2025-05-26 PROCEDURE — 97112 NEUROMUSCULAR REEDUCATION: CPT | Mod: CQ

## 2025-05-26 NOTE — PROGRESS NOTES
Outpatient Rehab    Physical Therapy Visit    Patient Name: Madelyn Vance  MRN: [id number]  YOB: 1985  Encounter Date: 5/26/2025    Therapy Diagnosis:   Encounter Diagnoses   Name Primary?    Posture abnormality Yes    Migraine without status migrainosus, not intractable, unspecified migraine type     Tenderness of neck     Numbness and tingling in both hands     Neck pain      Physician: Parvin Pitts, ANP    Physician Orders: Eval and Treat  Medical Diagnosis: Neck pain    Visit # / Visits Authorized:  3 / 12  Insurance Authorization Period: 5/9/2025 to 7/8/2025  Date of Evaluation: 5/9/2025  Plan of Care Certification: 5/9/2025 to 6/27/2025      PT/PTA: PTA   Number of PTA visits since last PT visit:3  Time In: 0935   Time Out: 1020  Total Time (in minutes): 45   Total Billable Time (in minutes): 45    FOTO:  Intake Score: 44%  Survey Score 2: 46%  Survey Score 3:  %    Precautions:       Subjective   Patient reports she does not have as much pain today compared to last PT session. She is still moving heavy items outside of PT and has soreness from that..  Pain reported as 3/10. neck / thoracic spine    Objective            Treatment:  Therapeutic Exercise  TE 1: Theraband Strengthening exercises (see exercise sheet)  TE 2: cervical / thoracic ROM and functional mobility (see exercise sheet)  TE 3: Postural correction for thoracic spine (see exercise sheet)  TE 4: Guided stretching of cervical / thoracic musculature  TE 5: Seated UE strengthening with Thera-band  TE 6: YTA's on SB  Manual Therapy  MT 1: Soft tissue mobs to cervical and thoracic musculature / suboccipital release  MT 2: Manual cervical traction  MT 3: thoracic PA mobs  Balance/Neuromuscular Re-Education  NMR 1: scapular re-ed: scapular retraction with rows and extension  NMR 2: thoracolumbar re-ed: chair extension, cat cow    Time Entry(in minutes):  Manual Therapy Time Entry: 15  Neuromuscular Re-Education Time Entry:  10  Therapeutic Exercise Time Entry: 20    Assessment & Plan   Assessment: Madelyn demonstrated a (+) response to session today as evidenced by report of reduced pain level with daily activities. Report of relief following soft tissue mobilization and manual cervical traction. She is progressing toward all goals as evidenced by slight increase in functional FOTO neck score, reduced pain level, and improved tolerance with daily activities. Continued education of proper lifting mechanics and postural correction to reduce stress on thoracic spine. Increased tone noted in the right thoracic paraspinals that was address with soft tissue mobilizations and gentle, grade ll PA mobilizations. Recommend pt continue with PT plan of care and HEP to address deficits.  Evaluation/Treatment Tolerance: Patient tolerated treatment well    The patient will continue to benefit from skilled outpatient physical therapy in order to address the deficits listed in the problem list on the initial evaluation, provide patient and family education, and maximize the patients level of independence in the home and community environments.     The patient's spiritual, cultural, and educational needs were considered, and the patient is agreeable to the plan of care and goals.     Education  Education was done with Patient. The patient's learning style includes Listening and Demonstration. The patient Demonstrates understanding and Verbalizes understanding.         - Written HEP issued with exercises and stretches demonstrated and performed. - importance and benefits of performing HEP daily for optimal improvements even after discharged from PT - importance of maintaining good cervical and thoracic posture with daily activities to reduce stress on spine - importance of stretching anterior chest muscles and strengthening posterior upper back muscles to improve posture - anatomy of cervical spine and how deficits cause pain and limit function         Plan: Continue with current POC and progress as tolerated.    Goals:   Active       PT goals:        Reduce c/o pain in cervical and thoracic spine to <3/10 to improve tolerance with daily activities (Progressing)       Start:  05/09/25    Expected End:  06/27/25             Improve cervical and upper thoracic posture to reduce symptoms of numbness and weakness in B UE (Progressing)       Start:  05/09/25    Expected End:  06/27/25             Improve L  strength by 5 lbs to reduce difficulty holding / dropping objects (Progressing)       Start:  05/09/25    Expected End:  06/27/25             Pt will demonstrate knowledge and independence with HEP to continue with exercises outside of therapy to facilitate optimal overall improvements (Progressing)       Start:  05/09/25    Expected End:  06/27/25            Pt will improve functional score on cervical FOTO by >20 points which indicates improved ability to perform daily tasks with less difficulty and pain  (Progressing)       Start:  05/09/25    Expected End:  06/27/25                Blossom Collado PTA

## 2025-05-30 ENCOUNTER — CLINICAL SUPPORT (OUTPATIENT)
Dept: REHABILITATION | Facility: HOSPITAL | Age: 40
End: 2025-05-30
Attending: NURSE PRACTITIONER
Payer: MEDICAID

## 2025-05-30 DIAGNOSIS — G43.909 MIGRAINE WITHOUT STATUS MIGRAINOSUS, NOT INTRACTABLE, UNSPECIFIED MIGRAINE TYPE: ICD-10-CM

## 2025-05-30 DIAGNOSIS — M54.2 TENDERNESS OF NECK: ICD-10-CM

## 2025-05-30 DIAGNOSIS — R20.2 NUMBNESS AND TINGLING IN BOTH HANDS: ICD-10-CM

## 2025-05-30 DIAGNOSIS — R20.0 NUMBNESS AND TINGLING IN BOTH HANDS: ICD-10-CM

## 2025-05-30 DIAGNOSIS — R29.3 POSTURE ABNORMALITY: Primary | ICD-10-CM

## 2025-05-30 PROCEDURE — 97110 THERAPEUTIC EXERCISES: CPT | Mod: CQ

## 2025-05-30 PROCEDURE — 97112 NEUROMUSCULAR REEDUCATION: CPT | Mod: CQ

## 2025-05-30 PROCEDURE — 97140 MANUAL THERAPY 1/> REGIONS: CPT | Mod: CQ

## 2025-05-30 NOTE — PROGRESS NOTES
Outpatient Rehab    Physical Therapy Visit    Patient Name: Madelyn Vance  MRN: [id number]  YOB: 1985  Encounter Date: 5/30/2025    Therapy Diagnosis:   Encounter Diagnoses   Name Primary?    Posture abnormality Yes    Migraine without status migrainosus, not intractable, unspecified migraine type     Tenderness of neck     Numbness and tingling in both hands      Physician: Parvin Pitts, ANP    Physician Orders: Eval and Treat  Medical Diagnosis: Neck pain    Visit # / Visits Authorized:  4 / 12  Insurance Authorization Period: 5/9/2025 to 7/8/2025  Date of Evaluation: 5/9/2025  Plan of Care Certification: 5/9/2025 to 6/27/2025      PT/PTA: PTA   Number of PTA visits since last PT visit:4  Time In: 0930   Time Out: 1015  Total Time (in minutes): 45   Total Billable Time (in minutes): 45    FOTO:  Intake Score:  %  Survey Score 2:  %  Survey Score 3:  %    Precautions:       Subjective   Patient states she is almost with the process of packing and moving, which is what has kept her pain level high for a coouple of weeks..  Pain reported as 6/10. neck / thoracic spine    Objective            Treatment:  Therapeutic Exercise  TE 1: Theraband Strengthening exercises (see exercise sheet)  TE 2: cervical / thoracic ROM and functional mobility (see exercise sheet)  TE 3: Postural correction for thoracic spine (see exercise sheet)  TE 4: Guided stretching of cervical / thoracic musculature  TE 5: Seated UE strengthening with Thera-band  TE 6: YTA's on SB  Manual Therapy  MT 1: Soft tissue mobs to cervical and thoracic musculature / suboccipital release  MT 2: Manual cervical traction  MT 3: thoracic PA mobs  Balance/Neuromuscular Re-Education  NMR 1: scapular re-ed: scapular retraction with rows and extension  NMR 2: thoracolumbar re-ed: chair extension, cat cow    Time Entry(in minutes):  Manual Therapy Time Entry: 15  Neuromuscular Re-Education Time Entry: 10  Therapeutic Exercise Time Entry:  20    Assessment & Plan   Assessment: Madelyn demonstrated a positive response to session today, as evidenced by ability to complete all exercises with increases made last session, good effort and minimal report of increased pain. Minimal verbal cues required for form. Some relief noted after STM and manual traction.  Evaluation/Treatment Tolerance: Patient tolerated treatment well    The patient will continue to benefit from skilled outpatient physical therapy in order to address the deficits listed in the problem list on the initial evaluation, provide patient and family education, and maximize the patients level of independence in the home and community environments.     The patient's spiritual, cultural, and educational needs were considered, and the patient is agreeable to the plan of care and goals.           Plan: Continue with current POC and progress as tolerated.    Goals:   Active       PT goals:        Reduce c/o pain in cervical and thoracic spine to <3/10 to improve tolerance with daily activities (Ongoing)       Start:  05/09/25    Expected End:  06/27/25             Improve cervical and upper thoracic posture to reduce symptoms of numbness and weakness in B UE (Ongoing)       Start:  05/09/25    Expected End:  06/27/25             Improve L  strength by 5 lbs to reduce difficulty holding / dropping objects (Ongoing)       Start:  05/09/25    Expected End:  06/27/25             Pt will demonstrate knowledge and independence with HEP to continue with exercises outside of therapy to facilitate optimal overall improvements (Ongoing)       Start:  05/09/25    Expected End:  06/27/25            Pt will improve functional score on cervical FOTO by >20 points which indicates improved ability to perform daily tasks with less difficulty and pain  (Ongoing)       Start:  05/09/25    Expected End:  06/27/25                Morro Tidwell, PTA

## 2025-06-02 ENCOUNTER — CLINICAL SUPPORT (OUTPATIENT)
Dept: REHABILITATION | Facility: HOSPITAL | Age: 40
End: 2025-06-02
Attending: NURSE PRACTITIONER
Payer: MEDICAID

## 2025-06-02 DIAGNOSIS — G43.909 MIGRAINE WITHOUT STATUS MIGRAINOSUS, NOT INTRACTABLE, UNSPECIFIED MIGRAINE TYPE: ICD-10-CM

## 2025-06-02 DIAGNOSIS — R29.3 POSTURE ABNORMALITY: Primary | ICD-10-CM

## 2025-06-02 DIAGNOSIS — R20.2 NUMBNESS AND TINGLING IN BOTH HANDS: ICD-10-CM

## 2025-06-02 DIAGNOSIS — M54.2 TENDERNESS OF NECK: ICD-10-CM

## 2025-06-02 DIAGNOSIS — R20.0 NUMBNESS AND TINGLING IN BOTH HANDS: ICD-10-CM

## 2025-06-02 PROCEDURE — 97140 MANUAL THERAPY 1/> REGIONS: CPT | Mod: CQ

## 2025-06-02 PROCEDURE — 97110 THERAPEUTIC EXERCISES: CPT | Mod: CQ

## 2025-06-02 PROCEDURE — 97112 NEUROMUSCULAR REEDUCATION: CPT | Mod: CQ

## 2025-06-06 ENCOUNTER — DOCUMENTATION ONLY (OUTPATIENT)
Dept: REHABILITATION | Facility: HOSPITAL | Age: 40
End: 2025-06-06
Payer: MEDICAID

## 2025-06-06 ENCOUNTER — CLINICAL SUPPORT (OUTPATIENT)
Dept: REHABILITATION | Facility: HOSPITAL | Age: 40
End: 2025-06-06
Attending: NURSE PRACTITIONER
Payer: MEDICAID

## 2025-06-06 DIAGNOSIS — M54.2 TENDERNESS OF NECK: ICD-10-CM

## 2025-06-06 DIAGNOSIS — R20.0 NUMBNESS AND TINGLING IN BOTH HANDS: ICD-10-CM

## 2025-06-06 DIAGNOSIS — R20.2 NUMBNESS AND TINGLING IN BOTH HANDS: ICD-10-CM

## 2025-06-06 DIAGNOSIS — R29.3 POSTURE ABNORMALITY: ICD-10-CM

## 2025-06-06 DIAGNOSIS — G43.909 MIGRAINE WITHOUT STATUS MIGRAINOSUS, NOT INTRACTABLE, UNSPECIFIED MIGRAINE TYPE: Primary | ICD-10-CM

## 2025-06-06 PROCEDURE — 97110 THERAPEUTIC EXERCISES: CPT

## 2025-06-06 PROCEDURE — 97112 NEUROMUSCULAR REEDUCATION: CPT

## 2025-06-06 PROCEDURE — 97140 MANUAL THERAPY 1/> REGIONS: CPT

## 2025-06-09 ENCOUNTER — CLINICAL SUPPORT (OUTPATIENT)
Dept: REHABILITATION | Facility: HOSPITAL | Age: 40
End: 2025-06-09
Attending: NURSE PRACTITIONER
Payer: MEDICAID

## 2025-06-09 DIAGNOSIS — M54.2 TENDERNESS OF NECK: ICD-10-CM

## 2025-06-09 DIAGNOSIS — R20.0 NUMBNESS AND TINGLING IN BOTH HANDS: ICD-10-CM

## 2025-06-09 DIAGNOSIS — G43.909 MIGRAINE WITHOUT STATUS MIGRAINOSUS, NOT INTRACTABLE, UNSPECIFIED MIGRAINE TYPE: ICD-10-CM

## 2025-06-09 DIAGNOSIS — R29.3 POSTURE ABNORMALITY: Primary | ICD-10-CM

## 2025-06-09 DIAGNOSIS — R20.2 NUMBNESS AND TINGLING IN BOTH HANDS: ICD-10-CM

## 2025-06-09 PROCEDURE — 97140 MANUAL THERAPY 1/> REGIONS: CPT

## 2025-06-09 PROCEDURE — 97110 THERAPEUTIC EXERCISES: CPT

## 2025-06-09 PROCEDURE — 97112 NEUROMUSCULAR REEDUCATION: CPT

## 2025-06-09 NOTE — PROGRESS NOTES
Outpatient Rehab    Physical Therapy Visit    Patient Name: Madelyn Vance  MRN: [id number]  YOB: 1985  Encounter Date: 6/9/2025    Therapy Diagnosis:   Encounter Diagnoses   Name Primary?    Posture abnormality Yes    Migraine without status migrainosus, not intractable, unspecified migraine type     Tenderness of neck     Numbness and tingling in both hands      Physician: Parvin Pitts, ANP    Physician Orders: Eval and Treat  Medical Diagnosis: Neck pain  Visit # / Visits Authorized:  7 / 12  Insurance Authorization Period: 5/9/2025 to 7/8/2025  Date of Evaluation: 5/9/2025  Plan of Care Certification: 5/9/2025 to 6/27/2025   RA completed: 6/6/25     PT/PTA: PT   Number of PTA visits since last PT visit:0  Time In: 0930   Time Out: 1015  Total Time (in minutes): 45   Total Billable Time (in minutes):      FOTO:  Intake Score:  %  Survey Score 2:  %  Survey Score 3:  %    Precautions:       Subjective   pt reports she felt good after she left therapy last session. she went run errands all day. Pain on B sides of neck became severe that night and the next day for unknown reason. Pain reduced today with no medication taken for pain.  Pain reported as 5/10. neck / thoracic spine    Objective            Treatment:  Therapeutic Exercise  TE 2: cervical / thoracic ROM and functional mobility exercises (see exercise sheet)  TE 3: thoracic postural strengthening exercises with and without resistance (see exercise sheet)  TE 4: cervical and thoracic stretches (see exercise sheet)  Manual Therapy  MT 1: STM: B upper and middle trap areas (see exercise sheet)  MT 2: manual cervical traction (see exercise sheet)      Time Entry(in minutes):  Manual Therapy Time Entry: 15  Neuromuscular Re-Education Time Entry: 10  Therapeutic Exercise Time Entry: 20    Assessment & Plan   Assessment: Pt performed all cervical and thoracic stretches, strengthening exercises, massage, and manual therapy with good effort with  continued cues required for proper technique to isolate specific muscles. Reduced c/o pain and symptoms after therapy however only temporary relief. Encouraged pt to continue to be aware of maintaining good posture with all daily activities. Pt does have increased thoracic kyphosis posture  Evaluation/Treatment Tolerance: Patient tolerated treatment well    The patient will continue to benefit from skilled outpatient physical therapy in order to address the deficits listed in the problem list on the initial evaluation, provide patient and family education, and maximize the patients level of independence in the home and community environments.     The patient's spiritual, cultural, and educational needs were considered, and the patient is agreeable to the plan of care and goals.           Plan: Continue with current POC, progress per pt's tolerance, and reassess pt at later date to determine need for additional therapy    Goals:   Active       PT goals:        Reduce c/o pain in cervical and thoracic spine to <3/10 to improve tolerance with daily activities (Progressing)       Start:  05/09/25    Expected End:  06/27/25             Improve cervical and upper thoracic posture to reduce symptoms of numbness and weakness in B UE (Progressing)       Start:  05/09/25    Expected End:  06/27/25             Improve L  strength by 5 lbs to reduce difficulty holding / dropping objects (Progressing)       Start:  05/09/25    Expected End:  06/27/25             Pt will demonstrate knowledge and independence with HEP to continue with exercises outside of therapy to facilitate optimal overall improvements (Progressing)       Start:  05/09/25    Expected End:  06/27/25            Pt will improve functional score on cervical FOTO by >20 points which indicates improved ability to perform daily tasks with less difficulty and pain  (Progressing)       Start:  05/09/25    Expected End:  06/27/25                Herlinda Garcia  PT

## 2025-06-10 ENCOUNTER — PATIENT MESSAGE (OUTPATIENT)
Dept: NEUROLOGY | Facility: CLINIC | Age: 40
End: 2025-06-10
Payer: MEDICAID

## 2025-06-10 DIAGNOSIS — G43.E11 CHRONIC MIGRAINE WITH AURA, INTRACTABLE, WITH STATUS MIGRAINOSUS: Chronic | ICD-10-CM

## 2025-06-10 RX ORDER — GABAPENTIN 300 MG/1
300 CAPSULE ORAL 3 TIMES DAILY
Qty: 90 CAPSULE | Refills: 4 | Status: SHIPPED | OUTPATIENT
Start: 2025-06-10 | End: 2025-06-30 | Stop reason: SDUPTHER

## 2025-06-20 ENCOUNTER — CLINICAL SUPPORT (OUTPATIENT)
Dept: REHABILITATION | Facility: HOSPITAL | Age: 40
End: 2025-06-20
Attending: NURSE PRACTITIONER
Payer: MEDICAID

## 2025-06-20 DIAGNOSIS — R20.0 NUMBNESS AND TINGLING IN BOTH HANDS: ICD-10-CM

## 2025-06-20 DIAGNOSIS — G43.909 MIGRAINE WITHOUT STATUS MIGRAINOSUS, NOT INTRACTABLE, UNSPECIFIED MIGRAINE TYPE: ICD-10-CM

## 2025-06-20 DIAGNOSIS — R29.3 POSTURE ABNORMALITY: Primary | ICD-10-CM

## 2025-06-20 DIAGNOSIS — R20.2 NUMBNESS AND TINGLING IN BOTH HANDS: ICD-10-CM

## 2025-06-20 DIAGNOSIS — M54.2 TENDERNESS OF NECK: ICD-10-CM

## 2025-06-20 PROCEDURE — 97112 NEUROMUSCULAR REEDUCATION: CPT

## 2025-06-20 PROCEDURE — 97140 MANUAL THERAPY 1/> REGIONS: CPT

## 2025-06-20 NOTE — PROGRESS NOTES
Outpatient Rehab    Physical Therapy Visit    Patient Name: Madelyn Vance  MRN: [id number]  YOB: 1985  Encounter Date: 6/20/2025    Therapy Diagnosis:   Encounter Diagnoses   Name Primary?    Posture abnormality Yes    Migraine without status migrainosus, not intractable, unspecified migraine type     Tenderness of neck     Numbness and tingling in both hands      Physician: Parvin Pitts, ANP    Physician Orders: Eval and Treat  Medical Diagnosis: Neck pain  Surgical Diagnosis: Not applicable for this Episode   Surgical Date: Not applicable for this Episode  Days Since Last Surgery: Not applicable for this Episode    Visit # / Visits Authorized:  8 / 12  Insurance Authorization Period: 5/9/2025 to 7/8/2025  Date of Evaluation: 5/9/2025  Plan of Care Certification: 5/9/2025 to 6/27/2025      PT/PTA: PT   Number of PTA visits since last PT visit:0  Time In: 0933   Time Out: 1017  Total Time (in minutes): 44   Total Billable Time (in minutes): 43    FOTO:  Intake Score: 46%  Survey Score 2: 50%  Survey Score 3:  %      Subjective   The pt states she has relief following PT, but the pain relief is not long lasting. She states she still has migraine headaches..  Pain reported as 6/10. base of skull and neck    Objective            Treatment:  Manual Therapy  MT 1: prone rib and thoracic mobs  MT 2: supine sub occipital distraction  MT 3: cervical up glides grades (I-IV)  MT 4: prone R CTJ mobs (grades I-III)  MT 5: supine first rib mobs (grades I-III)  MT 6: STM to (B) cervical PS, UT, and sub occipitals  Balance/Neuromuscular Re-Education  NMR 1: posture re-ed with seated chair extensions  NMR 2: thoracic mobility re-ed with open book stretch  NMR 3: cervical extensors re-ed with seated cervical retraction    Time Entry(in minutes):  Manual Therapy Time Entry: 33  Neuromuscular Re-Education Time Entry: 10    Assessment & Plan   Assessment: The pt had TTP and hypertonicity of R cervical sub  occipitals, PS, and UT as compared to L. She responded well to manual interventions and during prone thoracic mobs she had hypomobility of levels ~T3-T7. She was encouraged to ct with seated cervical retractions at home.        The patient will continue to benefit from skilled outpatient physical therapy in order to address the deficits listed in the problem list on the initial evaluation, provide patient and family education, and maximize the patients level of independence in the home and community environments.     The patient's spiritual, cultural, and educational needs were considered, and the patient is agreeable to the plan of care and goals.           Plan: Ct with current PT POC with plan to D/C at end of POC per pt request.    Goals:   Active       PT goals:        Reduce c/o pain in cervical and thoracic spine to <3/10 to improve tolerance with daily activities (Progressing)       Start:  05/09/25    Expected End:  06/27/25             Improve cervical and upper thoracic posture to reduce symptoms of numbness and weakness in B UE (Progressing)       Start:  05/09/25    Expected End:  06/27/25             Improve L  strength by 5 lbs to reduce difficulty holding / dropping objects (Progressing)       Start:  05/09/25    Expected End:  06/27/25             Pt will demonstrate knowledge and independence with HEP to continue with exercises outside of therapy to facilitate optimal overall improvements (Progressing)       Start:  05/09/25    Expected End:  06/27/25            Pt will improve functional score on cervical FOTO by >20 points which indicates improved ability to perform daily tasks with less difficulty and pain  (Progressing)       Start:  05/09/25    Expected End:  06/27/25                Blaise Zuniga, PT

## 2025-06-23 ENCOUNTER — CLINICAL SUPPORT (OUTPATIENT)
Dept: REHABILITATION | Facility: HOSPITAL | Age: 40
End: 2025-06-23
Attending: NURSE PRACTITIONER
Payer: MEDICAID

## 2025-06-23 DIAGNOSIS — R20.0 NUMBNESS AND TINGLING IN BOTH HANDS: ICD-10-CM

## 2025-06-23 DIAGNOSIS — R20.2 NUMBNESS AND TINGLING IN BOTH HANDS: ICD-10-CM

## 2025-06-23 DIAGNOSIS — M54.2 TENDERNESS OF NECK: ICD-10-CM

## 2025-06-23 DIAGNOSIS — G43.909 MIGRAINE WITHOUT STATUS MIGRAINOSUS, NOT INTRACTABLE, UNSPECIFIED MIGRAINE TYPE: ICD-10-CM

## 2025-06-23 DIAGNOSIS — R29.3 POSTURE ABNORMALITY: Primary | ICD-10-CM

## 2025-06-23 PROCEDURE — 97140 MANUAL THERAPY 1/> REGIONS: CPT

## 2025-06-23 PROCEDURE — 97112 NEUROMUSCULAR REEDUCATION: CPT

## 2025-06-23 NOTE — PROGRESS NOTES
Outpatient Rehab    Physical Therapy Visit    Patient Name: Madelyn Vance  MRN: [id number]  YOB: 1985  Encounter Date: 6/23/2025    Therapy Diagnosis:   Encounter Diagnoses   Name Primary?    Posture abnormality Yes    Migraine without status migrainosus, not intractable, unspecified migraine type     Tenderness of neck     Numbness and tingling in both hands      Physician: Parvin Pitts, ANP    Physician Orders: Eval and Treat  Medical Diagnosis: Neck pain  Surgical Diagnosis: Not applicable for this Episode   Surgical Date: Not applicable for this Episode  Days Since Last Surgery: Not applicable for this Episode    Visit # / Visits Authorized:  9 / 12  Insurance Authorization Period: 5/9/2025 to 7/8/2025  Date of Evaluation: 5/9/2025  Plan of Care Certification: 5/9/2025 to 6/27/2025      PT/PTA: PT   Number of PTA visits since last PT visit:0  Time In: 0930   Time Out: 1012  Total Time (in minutes): 42   Total Billable Time (in minutes): 40      Subjective   The pt reports about 24 hours of relief of pain since her last PT session, but that today her neck pain and headache are pretty bad..  Pain reported as 8/10. base of skull and neck    Objective            Treatment:  Manual Therapy  MT 1: prone rib and thoracic mobs  MT 2: supine sub occipital distraction  MT 3: cervical up glides grades (I-IV)  MT 4: supine first rib mobs (grades I-III)  MT 5: STM to (B) cervical PS, UT, and sub occipitals  Balance/Neuromuscular Re-Education  NMR 1: posture re-ed with seated chair extensions  NMR 2: cervical extensors re-ed with seated cervical retraction  NMR 3: scapular re-ed with seated HAM and shoulder abd with red TB resistance    Time Entry(in minutes):  Manual Therapy Time Entry: 30  Neuromuscular Re-Education Time Entry: 10    Assessment & Plan   Assessment: The pt ct to have TTP and palpable hypertonicity of R cervical PS and R sub occipitals as compared to L. Hypo-mobility was still present in  the thoracic spine at levels of ~T1-T6 areas. She completed seated scapular strengthening interventions pain free today. She will be D/C at the end of this week.        The patient will continue to benefit from skilled outpatient physical therapy in order to address the deficits listed in the problem list on the initial evaluation, provide patient and family education, and maximize the patients level of independence in the home and community environments.     The patient's spiritual, cultural, and educational needs were considered, and the patient is agreeable to the plan of care and goals.           Plan: Ct with current PT POC with plan to D/C at end of week.    Goals:   Active       PT goals:        Reduce c/o pain in cervical and thoracic spine to <3/10 to improve tolerance with daily activities (Progressing)       Start:  05/09/25    Expected End:  06/27/25             Improve cervical and upper thoracic posture to reduce symptoms of numbness and weakness in B UE (Progressing)       Start:  05/09/25    Expected End:  06/27/25             Improve L  strength by 5 lbs to reduce difficulty holding / dropping objects (Progressing)       Start:  05/09/25    Expected End:  06/27/25             Pt will demonstrate knowledge and independence with HEP to continue with exercises outside of therapy to facilitate optimal overall improvements (Progressing)       Start:  05/09/25    Expected End:  06/27/25            Pt will improve functional score on cervical FOTO by >20 points which indicates improved ability to perform daily tasks with less difficulty and pain  (Progressing)       Start:  05/09/25    Expected End:  06/27/25                Blaise Zuniga, PT

## 2025-06-26 ENCOUNTER — TELEPHONE (OUTPATIENT)
Dept: NEUROLOGY | Facility: CLINIC | Age: 40
End: 2025-06-26
Payer: MEDICAID

## 2025-06-30 ENCOUNTER — PATIENT MESSAGE (OUTPATIENT)
Dept: NEUROLOGY | Facility: CLINIC | Age: 40
End: 2025-06-30

## 2025-06-30 ENCOUNTER — OFFICE VISIT (OUTPATIENT)
Dept: NEUROLOGY | Facility: CLINIC | Age: 40
End: 2025-06-30
Payer: MEDICAID

## 2025-06-30 ENCOUNTER — TELEPHONE (OUTPATIENT)
Dept: NEUROLOGY | Facility: CLINIC | Age: 40
End: 2025-06-30

## 2025-06-30 DIAGNOSIS — G43.E11 CHRONIC MIGRAINE WITH AURA, INTRACTABLE, WITH STATUS MIGRAINOSUS: Primary | Chronic | ICD-10-CM

## 2025-06-30 DIAGNOSIS — M54.2 NECK PAIN: Chronic | ICD-10-CM

## 2025-06-30 DIAGNOSIS — G62.9 NEUROPATHY: ICD-10-CM

## 2025-06-30 DIAGNOSIS — E66.812 CLASS 2 OBESITY WITHOUT SERIOUS COMORBIDITY WITH BODY MASS INDEX (BMI) OF 37.0 TO 37.9 IN ADULT, UNSPECIFIED OBESITY TYPE: ICD-10-CM

## 2025-06-30 DIAGNOSIS — R51.9 CHRONIC DAILY HEADACHE: Chronic | ICD-10-CM

## 2025-06-30 RX ORDER — GABAPENTIN 300 MG/1
300 CAPSULE ORAL 3 TIMES DAILY
Qty: 90 CAPSULE | Refills: 4 | Status: SHIPPED | OUTPATIENT
Start: 2025-06-30 | End: 2026-06-30

## 2025-06-30 RX ORDER — TIZANIDINE 2 MG/1
2 TABLET ORAL 2 TIMES DAILY PRN
Qty: 30 TABLET | Refills: 2 | Status: SHIPPED | OUTPATIENT
Start: 2025-06-30 | End: 2026-06-30

## 2025-06-30 NOTE — PROGRESS NOTES
"[facility] Telemedicine Follow Up Visit Note    Initial Visit Date: 3/28/2025  Last Visit Date:   Current Visit Date:  06/30/2025    Chief Complaint:     Chief Complaint   Patient presents with    Migraine     Pt states she is having 2-4 migraines in a week       History of Present Illness:      This is 40 y.o.  female with history of EVELIA, plantar facial fibromatosis, insomnia, vitamin D deficiency, depression, rhinitis, who was referred headache disorder. Denies snoring, denies awakening gasping for air, occasional naps, occasional daytime somnolence. Also c/o numbness/tingling/burning to L arm, numbness to toes on L foot. Admits to neck pain, numbness worse to hands when laying down. During last visit, Pt was referred to Dr. Corona in [place] for murmur, sumatriptan was increased to 100mg PO BID PRN, gabapentin 100mg PO TID, metoclopramide 10mg PO BID PRN, tizanidine 2mg PO BDI PRN  were initiated. Ketorolac 10mg PO Q6 x 5 days for current headache was provided. X-ray of C-spine in [place] was ordered.     Today, Pt states josiane effective for "pain", but would like an increase to TID. Physical therapy causes "nerve" storm throughout her body. Cancelled last two visits. Migraines average 2-4 week. Increased sumatriptan effective, but causes sedation. Metoclopramide somewhat effective. Tizanidine taken at night. Feels like her hands "lock" up from time to time. Hx of increased RA "factors", requesting referral (Pt to request referral from PCP)    Age of Onset : 12-14 YO    Headache Description: located to L frontal area sometimes L occipital area; constant dull, pressure, stabbing, throbbing, accompanied by sparkles, bright lights, photophobia/phonophobia, nausea    Frequency: 30 headache days per month     Provocation Factors: lack of sleep; increased stress    Risk Factors  - Family history of headache disorder: No  - History of focal CNS lesions: Unknown  - History of CNS infections: " No  - History head trauma: Yes fell through trampoline; broken nose  - History of underlying mood disorder: Yes depression/anxiety  - History of sleep disorder: Yes insomnia; better with trazodone  - Recreational drug use: No  - Tobacco use: No  - Alcohol use: No  - Weight fluctuation: No  - Isotretinoin or Tetracycline use:  Unknown  - Family planning and contraceptive use: Yes IUD    Medications:     Current Prophylactic  Escitalopram 20mg PO Qday (4/3/2024 - present) ineffective  Lisinopril 10mg PO Qday (10/11/2024 - present) ineffective  Trazodone 100mg nightly (11/20/2024 - present) ineffective    Current Abortive  Sumatriptan 25mg PO BID PRN (2/4/2025 - present)  ineffective    Prior Prophylactic  Paxil 10mg PO (1/2/2017 - 1/2/2018) ineffective    Prior Abortive  Ubrelvy samples via PCP - effective but caused sedation    Devices:     - VNS:  - TNS  - TMS:     Procedures:     - Botox:  - PSG block:   - Occipital nerve block:     Labs:     Results for orders placed or performed in visit on 05/09/25   TSH    Collection Time: 05/09/25  7:25 AM   Result Value Ref Range    TSH 1.415 0.350 - 4.940 uIU/mL   Lipid Panel    Collection Time: 05/09/25  7:25 AM   Result Value Ref Range    Cholesterol Total 180 <=200 mg/dL    HDL Cholesterol 46 35 - 60 mg/dL    Triglyceride 79 37 - 140 mg/dL    Cholesterol/HDL Ratio 4 0 - 5    Very Low Density Lipoprotein 16     LDL Cholesterol 118.00 50.00 - 140.00 mg/dL   T4, Free    Collection Time: 05/09/25  7:25 AM   Result Value Ref Range    Thyroxine Free 0.92 0.70 - 1.48 ng/dL       Studies:     - MRI Brain:   - MRA Head w/o Xavier:   - MRV Head w/o Xavier:   - NCHCT:  - Lumbar Puncture:    Review of Systems:     ROS  As per HPI. All other systems negative  Physical Exams:     There were no vitals filed for this visit.    Physical Exam  Constitutional:       Appearance: Normal appearance. She is obese.   HENT:      Head: Normocephalic and atraumatic.      Right Ear: External ear normal.       Left Ear: External ear normal.      Nose: Nose normal.      Mouth/Throat:      Mouth: Mucous membranes are moist.      Pharynx: Oropharynx is clear.   Eyes:      Conjunctiva/sclera: Conjunctivae normal.   Neck:      Comments: Tenderness to palpation bilat C-spine radiating to bilat trapezius w/increased tone; painful ROM to R c-spine when turning to R, painful ROM when turning to L, painful neck pain with bilat ear to shoulder tile  Cardiovascular:      Pulses: Normal pulses.   Pulmonary:      Breath sounds: Normal breath sounds.   Abdominal:      General: There is no distension.      Tenderness: There is no guarding.   Musculoskeletal:         General: Normal range of motion.      Cervical back: Normal range of motion.   Skin:     Coloration: Skin is not jaundiced.      Findings: No lesion or rash.   Neurological:      Mental Status: She is alert.     Comprehensive Neurological Exam:  Mental Status: Alert Oriented to Self, Date, and Place. Naming, repetition, and reading wnl. Comprehension wnl. No dysarthria.   CN II - XII: KAITY, No APD, VA grossly intact to finger counting at 6 ft, VFFC, No ptosis OU, EOMI without nystagmus, LT/Temp symmetric in CN V1-3 distribution w/exception of decreased sensation to L V1-3, Hearing grossly intact, Face Symmetric, Tongue and Uvula midline, Trapezius symmetric bilateral.   Motor: tone and bulk wnl throughout, no abnormal involuntary or voluntary movements, 5/5 to confrontation, Fine finger movements wnl b/l, No pronator drift.   Sensory: LT, Proprioception, Vibration, PP, Temp symmetric w/exception of decreased sensation to L upper/lower ext. No sensory simultagnosia.   Reflexes: MATT due to nature of visit  Cerebellar: MATT due to nature of visit  Romberg: Negative  Gait: normal, Bilat arm swing itnact    Assessment:     This is 40 y.o. female with history of EVELIA, plantar facial fibromatosis, insomnia, vitamin D deficiency, who was referred headache disorder. Symptoms  "indicative of chronic daily and migraine headache with aura, intractable with status. Affects ADLs. Sumatriptan ineffective at current dose. Tried sample of Ubrelvy which caused sedation. Also c/o neck pain with bilat hand numbness when laying down. Tingling/burning to L upper ext and numbness to toes on L foot. Sleeping well on trazodone. Not symptomatic for FRENCH. Murmur auscultated, will refer to cardiology. Pt states josiane effective for "pain", but would like an increase to TID. Physical therapy causes "nerve" storm throughout her body. Cancelled last two visits. Migraines average 2-4 week. Increased sumatriptan effective, but causes sedation. Metoclopramide somewhat effective. Tizanidine taken at night. Feels like her hands "lock" up from time to time. Hx of increased RA "factors", requesting referral (Pt to request referral from PCP)No formal exercise program at this time.     1. Chronic migraine with aura, intractable, with status migrainosus  -     ubrogepant (UBRELVY) 100 mg tablet; Take 1 tablet (100 mg total) by mouth 2 (two) times daily as needed for Migraine. If symptoms persist or return, may repeat dose after 2 hours. Maximum: 200 mg per 24 hours  Dispense: 16 tablet; Refill: 2  -     gabapentin (NEURONTIN) 300 MG capsule; Take 1 capsule (300 mg total) by mouth 3 (three) times daily.  Dispense: 90 capsule; Refill: 4    2. Chronic daily headache    3. Neuropathy  -     gabapentin (NEURONTIN) 300 MG capsule; Take 1 capsule (300 mg total) by mouth 3 (three) times daily.  Dispense: 90 capsule; Refill: 4  -     Ambulatory referral/consult to Pain Clinic; Future; Expected date: 07/07/2025  -     MRI Cervical Spine Without Contrast; Future; Expected date: 07/07/2025    4. Class 2 obesity without serious comorbidity with body mass index (BMI) of 37.0 to 37.9 in adult, unspecified obesity type    5. Neck pain  -     tiZANidine (ZANAFLEX) 2 MG tablet; Take 1 tablet (2 mg total) by mouth 2 (two) times daily as " needed (tension).  Dispense: 30 tablet; Refill: 2  -     Ambulatory referral/consult to Pain Clinic; Future; Expected date: 07/07/2025  -     MRI Cervical Spine Without Contrast; Future; Expected date: 07/07/2025      Plan:     [] increase gabapentin to 300mg PO TID for migraine prevention and neuropathy  [] c/w metoclopramide 10mg PO BID PRN nausea  [] d/c sumatriptan 100mg PO BID PRN due to sedation  [] c/w tizanidine 2mg PO BID PRN for neck tension  [] start Ubrelvy 100mg PO BID PRN as abortive therapy  [] call office for migraine >24 hrs and failed abortive therapy; will call in HA cocktail; does not tolerate steroids  [] Pt to call PCP for RA work up  [] MRI C-spine  [] referral to pain management - Dr. Last    RTC 3 mths - TM okay    Headache education provided: good sleep hygiene and 7 hours of sleep per night, stress management, medication overuse education provided. Using more 3 OTC per week may worsen headaches, high intensity interval training has shown to reduce headache frequency. Low carb, high protein has shown to reduce headache frequency. Patient is instructed in keep headache diary.     I have explained the treatment plan, diagnosis, and prognosis to patient. All questions are answered to the best of my knowledge.     Visit today is associated with current or anticipated ongoing medical care related to this patient's single serious condition/complex condition as documented above.     Face to face time 30 minutes, including documentation, chart review, counseling, education, review of test results, relevant medical records, and coordination of care.       Parvin Pitts NP-C  General Neurology  06/30/2025      The patient location is: [place]  The chief complaint leading to consultation is: neck    Visit type: audiovisual    Face to Face time with patient: 30  30 minutes of total time spent on the encounter, which includes face to face time and non-face to face time preparing to see the patient  (eg, review of tests), Obtaining and/or reviewing separately obtained history, Documenting clinical information in the electronic or other health record, Independently interpreting results (not separately reported) and communicating results to the patient/family/caregiver, or Care coordination (not separately reported).     Each patient to whom he or she provides medical services by telemedicine is:  (1) informed of the relationship between the physician and patient and the respective role of any other health care provider with respect to management of the patient; and (2) notified that he or she may decline to receive medical services by telemedicine and may withdraw from such care at any time.    Notes:

## 2025-06-30 NOTE — TELEPHONE ENCOUNTER
Spoke to Quan at [facility] and informed him per chart notes pt is having 2-4 migraines a week

## 2025-06-30 NOTE — TELEPHONE ENCOUNTER
----- Message from Ynes sent at 6/30/2025 10:57 AM CDT -----  Regarding: NILAY Glass with [facility] pharmacy called needing to know how many migraines is pt having per month because the UBRELVY was increased to 16 tablets.   Please contact [phone]

## 2025-07-01 ENCOUNTER — DOCUMENTATION ONLY (OUTPATIENT)
Dept: REHABILITATION | Facility: HOSPITAL | Age: 40
End: 2025-07-01
Payer: MEDICAID

## 2025-07-01 NOTE — PROGRESS NOTES
[facility]  Physical Therapy Discharge Note    Name: Madelyn Vance  Clinic Number: [id number]    Medical diagnosis: Neck pain   PT diagnosis: posture abnormalities, migraine, tenderness of neck, numbness and tingling in B UE    Date of Last visit: 6/23/25  Total Visits Received: 3 plus initial eval    Assessment      Madelyn Vance did not return to physical therapy today for final assessment for discharge.  Madelyn goals were address as listed below and met as stated.  Madelyn was issued a home exercise program with handouts throughout this episode of care to reference for continued wellness and physical fitness . Contact information was given at evaluation in case any questions arise in the future or if therapy is needed.    Discharge reason: patient did not return for formal physical therapy. Pt called stating  advised pt to be discharged from PT due to causing more nerve damage    Plan   This patient is discharged from Physical Therapy.

## 2025-07-07 DIAGNOSIS — G43.E11 CHRONIC MIGRAINE WITH AURA, INTRACTABLE, WITH STATUS MIGRAINOSUS: Primary | ICD-10-CM

## 2025-07-07 RX ORDER — RIZATRIPTAN BENZOATE 10 MG/1
10 TABLET ORAL 2 TIMES DAILY PRN
Qty: 9 TABLET | Refills: 2 | Status: SHIPPED | OUTPATIENT
Start: 2025-07-07 | End: 2025-08-06

## 2025-07-11 ENCOUNTER — PATIENT MESSAGE (OUTPATIENT)
Dept: NEUROLOGY | Facility: CLINIC | Age: 40
End: 2025-07-11
Payer: MEDICAID

## 2025-07-11 DIAGNOSIS — G43.E11 CHRONIC MIGRAINE WITH AURA, INTRACTABLE, WITH STATUS MIGRAINOSUS: Primary | ICD-10-CM

## 2025-07-11 RX ORDER — DIAZEPAM 2 MG/1
2 TABLET ORAL ONCE
Qty: 1 TABLET | Refills: 0 | Status: SHIPPED | OUTPATIENT
Start: 2025-07-11 | End: 2025-07-11

## 2025-07-17 ENCOUNTER — HOSPITAL ENCOUNTER (OUTPATIENT)
Dept: RADIOLOGY | Facility: HOSPITAL | Age: 40
Discharge: HOME | End: 2025-07-17
Attending: NURSE PRACTITIONER
Payer: MEDICAID

## 2025-07-17 ENCOUNTER — PATIENT MESSAGE (OUTPATIENT)
Dept: NEUROLOGY | Facility: CLINIC | Age: 40
End: 2025-07-17
Payer: MEDICAID

## 2025-07-17 DIAGNOSIS — M54.2 NECK PAIN: Chronic | ICD-10-CM

## 2025-07-17 DIAGNOSIS — G62.9 NEUROPATHY: ICD-10-CM

## 2025-07-17 NOTE — PROGRESS NOTES
MRI C-SPINE W/O CONTRAST UNABLE TO BE COMPLETED TODAY DUE TO PATIENT BEING CLAUSTROPHOBIC. PATIENT STATES THE SCANNER IS TOO TIGHT FOR HER AND SHE WANTED TO STOP THE EXAM. PATIENT GIVEN THE NUMBER TO RESCHEDULE AND ALSO A LIST OF OTHER FACILITIES THAT HAVE BIGGER SCANNERS.

## 2025-07-30 ENCOUNTER — HOSPITAL ENCOUNTER (OUTPATIENT)
Dept: RADIOLOGY | Facility: HOSPITAL | Age: 40
Discharge: HOME | End: 2025-07-30
Attending: NURSE PRACTITIONER
Payer: MEDICAID

## 2025-07-30 PROCEDURE — 72141 MRI NECK SPINE W/O DYE: CPT | Mod: TC

## 2025-07-31 DIAGNOSIS — M54.2 NECK PAIN: Primary | ICD-10-CM

## 2025-08-04 DIAGNOSIS — G43.E11 CHRONIC MIGRAINE WITH AURA, INTRACTABLE, WITH STATUS MIGRAINOSUS: Primary | ICD-10-CM

## 2025-08-22 ENCOUNTER — PATIENT MESSAGE (OUTPATIENT)
Dept: NEUROLOGY | Facility: CLINIC | Age: 40
End: 2025-08-22
Payer: MEDICAID

## 2025-08-22 ENCOUNTER — CLINICAL SUPPORT (OUTPATIENT)
Dept: REHABILITATION | Facility: HOSPITAL | Age: 40
End: 2025-08-22
Attending: NURSE PRACTITIONER
Payer: MEDICAID

## 2025-08-22 DIAGNOSIS — R20.2 NUMBNESS AND TINGLING IN BOTH HANDS: ICD-10-CM

## 2025-08-22 DIAGNOSIS — R29.3 POSTURE ABNORMALITY: ICD-10-CM

## 2025-08-22 DIAGNOSIS — M54.2 NECK PAIN: Primary | Chronic | ICD-10-CM

## 2025-08-22 DIAGNOSIS — M53.82 DECREASED ROM OF INTERVERTEBRAL DISCS OF CERVICAL SPINE: ICD-10-CM

## 2025-08-22 DIAGNOSIS — R51.9 CHRONIC DAILY HEADACHE: ICD-10-CM

## 2025-08-22 DIAGNOSIS — R29.898 DECREASED GRIP STRENGTH OF LEFT HAND: ICD-10-CM

## 2025-08-22 DIAGNOSIS — R20.0 NUMBNESS AND TINGLING IN BOTH HANDS: ICD-10-CM

## 2025-08-22 DIAGNOSIS — M54.2 TENDERNESS OF NECK: ICD-10-CM

## 2025-08-22 PROCEDURE — 97162 PT EVAL MOD COMPLEX 30 MIN: CPT

## 2025-09-02 ENCOUNTER — HOSPITAL ENCOUNTER (EMERGENCY)
Facility: HOSPITAL | Age: 40
Discharge: HOME | End: 2025-09-02
Attending: EMERGENCY MEDICINE
Payer: MEDICAID

## 2025-09-02 VITALS
RESPIRATION RATE: 20 BRPM | SYSTOLIC BLOOD PRESSURE: 144 MMHG | DIASTOLIC BLOOD PRESSURE: 91 MMHG | TEMPERATURE: 100 F | HEART RATE: 88 BPM | OXYGEN SATURATION: 100 %

## 2025-09-02 DIAGNOSIS — U07.1 COVID: Primary | ICD-10-CM

## 2025-09-02 LAB
FLUAV AG UPPER RESP QL IA.RAPID: NOT DETECTED
FLUBV AG UPPER RESP QL IA.RAPID: NOT DETECTED
SARS-COV-2 RNA RESP QL NAA+PROBE: DETECTED
STREP A PCR (OHS): NOT DETECTED

## 2025-09-02 PROCEDURE — 87651 STREP A DNA AMP PROBE: CPT | Performed by: EMERGENCY MEDICINE

## 2025-09-02 PROCEDURE — 96372 THER/PROPH/DIAG INJ SC/IM: CPT | Performed by: EMERGENCY MEDICINE

## 2025-09-02 PROCEDURE — 87636 SARSCOV2 & INF A&B AMP PRB: CPT | Performed by: EMERGENCY MEDICINE

## 2025-09-02 PROCEDURE — 99284 EMERGENCY DEPT VISIT MOD MDM: CPT | Mod: 25

## 2025-09-02 PROCEDURE — 63600175 PHARM REV CODE 636 W HCPCS: Mod: JZ,TB | Performed by: EMERGENCY MEDICINE

## 2025-09-02 RX ORDER — KETOROLAC TROMETHAMINE 30 MG/ML
30 INJECTION, SOLUTION INTRAMUSCULAR; INTRAVENOUS
Status: COMPLETED | OUTPATIENT
Start: 2025-09-02 | End: 2025-09-02

## 2025-09-02 RX ADMIN — KETOROLAC TROMETHAMINE 30 MG: 30 INJECTION, SOLUTION INTRAMUSCULAR; INTRAVENOUS at 08:09

## (undated) DEVICE — TUBE SUCTION MEDI-VAC STERILE

## (undated) DEVICE — COVER HANDLE LIGHT RIGID

## (undated) DEVICE — PENCIL ELECSURG ROCKER 15FT

## (undated) DEVICE — SUT VICRYL PLUS 4-0 FS-2 27IN

## (undated) DEVICE — SEAL LENS SCOPE MYOSURE

## (undated) DEVICE — TRAY SKIN SCRUB WET PREMIUM

## (undated) DEVICE — TUBING LAP SMOKE EVAC 6.5MM

## (undated) DEVICE — GLOVE SIGNATURE ESSNTL LTX 8

## (undated) DEVICE — SOL NORMAL USPCA 0.9%

## (undated) DEVICE — TOWEL OR DISP STRL BLUE 4/PK

## (undated) DEVICE — CANNULA ENDOPATH XCEL 5X100MM

## (undated) DEVICE — DRESSING TELFA N ADH 3X8

## (undated) DEVICE — KIT ANTIFOG W/SPONG & FLUID

## (undated) DEVICE — TROCAR ENDOPATH XCEL 5X100MM

## (undated) DEVICE — KIT GYN LAPAROSCOPY LAFAYETTE

## (undated) DEVICE — SUPPORT ULNA NERVE PROTECTOR

## (undated) DEVICE — TUBING INSUFFLATOR W/ROT CONCT

## (undated) DEVICE — PAD PREP CUFFED NS 24X48IN

## (undated) DEVICE — ELECTRODE PATIENT RETURN DISP

## (undated) DEVICE — DEVICE ABLATION NOVASURE DISP

## (undated) DEVICE — GOWN POLY REINF BRTH SLV XL

## (undated) DEVICE — SOL NACL IRR 1000ML BTL

## (undated) DEVICE — Device

## (undated) DEVICE — SOL IRRI STRL WATER 1000ML

## (undated) DEVICE — DRAPE UND BUTT W/POUCH 40X44IN

## (undated) DEVICE — ADHESIVE DERMABOND ADVANCED

## (undated) DEVICE — PAD CURITY MATERNITY PERI

## (undated) DEVICE — TIP YANKAUERS BULB NO VENT

## (undated) DEVICE — TROCAR ENDOPATH XCEL 5MM 15CM